# Patient Record
Sex: FEMALE | Race: WHITE | Employment: FULL TIME | URBAN - METROPOLITAN AREA
[De-identification: names, ages, dates, MRNs, and addresses within clinical notes are randomized per-mention and may not be internally consistent; named-entity substitution may affect disease eponyms.]

---

## 2022-02-08 ENCOUNTER — HOSPITAL ENCOUNTER (EMERGENCY)
Age: 47
Discharge: HOME | End: 2022-02-08
Payer: COMMERCIAL

## 2022-02-08 ENCOUNTER — APPOINTMENT (OUTPATIENT)
Dept: GENERAL RADIOLOGY | Age: 47
End: 2022-02-08
Payer: COMMERCIAL

## 2022-02-08 VITALS
WEIGHT: 240 LBS | HEIGHT: 67 IN | DIASTOLIC BLOOD PRESSURE: 85 MMHG | TEMPERATURE: 96.4 F | RESPIRATION RATE: 16 BRPM | HEART RATE: 78 BPM | BODY MASS INDEX: 37.67 KG/M2 | SYSTOLIC BLOOD PRESSURE: 137 MMHG | OXYGEN SATURATION: 98 %

## 2022-02-08 DIAGNOSIS — M25.461 EFFUSION OF RIGHT KNEE: ICD-10-CM

## 2022-02-08 DIAGNOSIS — S83.91XA SPRAIN OF RIGHT KNEE, UNSPECIFIED LIGAMENT, INITIAL ENCOUNTER: ICD-10-CM

## 2022-02-08 DIAGNOSIS — S86.911A STRAIN OF RIGHT KNEE, INITIAL ENCOUNTER: Primary | ICD-10-CM

## 2022-02-08 PROCEDURE — 6370000000 HC RX 637 (ALT 250 FOR IP): Performed by: PHYSICIAN ASSISTANT

## 2022-02-08 PROCEDURE — 99284 EMERGENCY DEPT VISIT MOD MDM: CPT

## 2022-02-08 PROCEDURE — 73564 X-RAY EXAM KNEE 4 OR MORE: CPT

## 2022-02-08 RX ORDER — HYDROCODONE BITARTRATE AND ACETAMINOPHEN 5; 325 MG/1; MG/1
2 TABLET ORAL ONCE
Status: COMPLETED | OUTPATIENT
Start: 2022-02-08 | End: 2022-02-08

## 2022-02-08 RX ORDER — IBUPROFEN 600 MG/1
600 TABLET ORAL ONCE
Status: COMPLETED | OUTPATIENT
Start: 2022-02-08 | End: 2022-02-08

## 2022-02-08 RX ORDER — HYDROCODONE BITARTRATE AND ACETAMINOPHEN 5; 325 MG/1; MG/1
1 TABLET ORAL EVERY 8 HOURS PRN
Qty: 9 TABLET | Refills: 0 | Status: SHIPPED | OUTPATIENT
Start: 2022-02-08 | End: 2022-02-11

## 2022-02-08 RX ADMIN — IBUPROFEN 600 MG: 600 TABLET ORAL at 22:29

## 2022-02-08 RX ADMIN — HYDROCODONE BITARTRATE AND ACETAMINOPHEN 2 TABLET: 5; 325 TABLET ORAL at 22:29

## 2022-02-08 ASSESSMENT — PAIN DESCRIPTION - FREQUENCY: FREQUENCY: CONTINUOUS

## 2022-02-08 ASSESSMENT — PAIN SCALES - GENERAL
PAINLEVEL_OUTOF10: 10
PAINLEVEL_OUTOF10: 10

## 2022-02-08 ASSESSMENT — PAIN DESCRIPTION - ORIENTATION: ORIENTATION: RIGHT

## 2022-02-08 ASSESSMENT — PAIN DESCRIPTION - LOCATION: LOCATION: KNEE

## 2022-02-08 ASSESSMENT — PAIN DESCRIPTION - PAIN TYPE: TYPE: ACUTE PAIN

## 2022-02-08 ASSESSMENT — PAIN DESCRIPTION - DESCRIPTORS: DESCRIPTORS: ACHING

## 2022-02-15 ENCOUNTER — OFFICE VISIT (OUTPATIENT)
Dept: ORTHOPEDIC SURGERY | Age: 47
End: 2022-02-15
Payer: COMMERCIAL

## 2022-02-15 DIAGNOSIS — S83.241A ACUTE MEDIAL MENISCUS TEAR, RIGHT, INITIAL ENCOUNTER: Primary | ICD-10-CM

## 2022-02-15 PROCEDURE — 99244 OFF/OP CNSLTJ NEW/EST MOD 40: CPT | Performed by: ORTHOPAEDIC SURGERY

## 2022-02-15 RX ORDER — IBUPROFEN 200 MG
200 TABLET ORAL EVERY 6 HOURS PRN
COMMUNITY

## 2022-02-15 RX ORDER — NAPROXEN 500 MG/1
500 TABLET ORAL 2 TIMES DAILY WITH MEALS
COMMUNITY

## 2022-02-15 RX ORDER — HYDROCODONE BITARTRATE AND ACETAMINOPHEN 5; 325 MG/1; MG/1
1 TABLET ORAL EVERY 6 HOURS PRN
COMMUNITY

## 2022-02-15 NOTE — LETTER
2/15/2022        Karlene Aurea  1975    To Whom It May Concern: The above named patient is currently under my care for the treatment of She will need to return to work Monday 2/21/22 seated duty only no stairs. Pending follow up treatment/care/testing for Right knee injury. If you have any questions regarding this patient or his/her care please  Contact my office. Thank you.       Sincerely,        Ting Marcelino MD  [address]  PH# [phone]

## 2022-02-15 NOTE — PROGRESS NOTES
taking: Reported on 2/15/2022)       No current facility-administered medications for this visit. ALLERGIES   No Known Allergies    FAMILY HISTORY   History reviewed. No pertinent family history. SOCIAL HISTORY     Social History     Socioeconomic History    Marital status:      Spouse name: None    Number of children: None    Years of education: None    Highest education level: None   Occupational History    None   Tobacco Use    Smoking status: Never Smoker    Smokeless tobacco: Never Used   Substance and Sexual Activity    Alcohol use: Never    Drug use: Never    Sexual activity: None   Other Topics Concern    None   Social History Narrative    None     Social Determinants of Health     Financial Resource Strain:     Difficulty of Paying Living Expenses: Not on file   Food Insecurity:     Worried About Running Out of Food in the Last Year: Not on file    [place] of Food in the Last Year: Not on file   Transportation Needs:     Lack of Transportation (Medical): Not on file    Lack of Transportation (Non-Medical):  Not on file   Physical Activity:     Days of Exercise per Week: Not on file    Minutes of Exercise per Session: Not on file   Stress:     Feeling of Stress : Not on file   Social Connections:     Frequency of Communication with Friends and Family: Not on file    Frequency of Social Gatherings with Friends and Family: Not on file    Attends Roman Catholic Services: Not on file    Active Member of Clubs or Organizations: Not on file    Attends Club or Organization Meetings: Not on file    Marital Status: Not on file   Intimate Partner Violence:     Fear of Current or Ex-Partner: Not on file    Emotionally Abused: Not on file    Physically Abused: Not on file    Sexually Abused: Not on file   Housing Stability:     Unable to Pay for Housing in the Last Year: Not on file    Number of [place] in the Last Year: Not on file    Unstable Housing in the Last Year: Not on file       REVIEW OF SYSTEMS   General: no fever, chills, night sweats, anorexia, malaise, fatigue, or weight change  Hematologic:  no unexplained bleeding or bruising  HEENT:   no nasal congestion, rhinorrhea, sore throat, or facial pain  Respiratory:  no cough, dyspnea, or chest pain  Cardiovascular:  no angina, AMOR, PND, orthopnea, dependent edema, or palpitations  Gastrointestinal:  no nausea, vomiting, diarrhea, constipation, or abdominal pain  Genitourinary:  no urinary urgency, frequency, dysuria, or hematuria  Musculoskeletal: see HPI  Endocrine:  no heat or cold intolerance and no polyphagia, polydipsia, or polyuria  Skin:  no skin eruptions or changing lesions  Neurologic:  no focal weakness, numbness/tingling, tremor, or severe headache. See HPI. See HPI for pertinent positives. PHYSICAL EXAM   Vital Signs: There were no vitals taken for this visit. General appearance: healthy, alert, no distress  Skin: Skin color, texture, turgor normal. No rashes or lesions  HEENT: atraumatic, normocephalic. PERRL  Respiratory: Unlabored breathing  Lymphatic: No adenopathy   Neuro: Alert and oriented, normal distal sensation, normal bilateral DTRs  Vascular: Normal distal capillary and distal pulses  Muskuloskeletal Exam:     Right Knee Examination    Inspection: Inspection of the knee reveals no swelling, ecchymosis or deformity. Palpation: There is a mild knee effusion. and There is moderate tenderness along the medial joint line. Range of Motion: Full extension to 20 degrees of flexion. Range of motion restricted secondary to pain    Strength: Hamstrings rated: 5/5. Quadriceps rated: 5/5. Special Tests: The knee is stable to varus valgus stress and Lachman's testing. She is unable to tolerate any additional provocative maneuvers.   There is pain throughout range of motion along the medial knee and medial patellofemoral retinaculum     Gait: Gait is antalgic favoring the affected side.        RADIOLOGY   X-rays obtained and reviewed in office:  Views right knee 4 views    Impression: Moderate tricompartmental degenerative changes noted. IMPRESSION     1. Acute medial meniscus tear, right, initial encounter         PLAN   I had a lengthy discussion with patient today regarding diagnosis and treatment options and recommendations. Recommend further evaluation of the knee with an MRI scan to evaluate for internal derangement. Continue with crutches, rest, ice, over-the-counter analgesic medications. Discontinue the knee immobilizer. Patient instructed on ankle pumps range of motion exercises and knee range of motion exercises. FOLLOWUP     Return for test results. No orders of the defined types were placed in this encounter. No orders of the defined types were placed in this encounter.       Patient was instructed on appropriate use of braces, participation in home exercise programs, healthy lifestyle choices and weight loss as appropriate     Ritika Layne MD

## 2022-02-21 ENCOUNTER — HOSPITAL ENCOUNTER (OUTPATIENT)
Dept: MRI IMAGING | Age: 47
Discharge: HOME | End: 2022-02-21
Payer: COMMERCIAL

## 2022-02-21 DIAGNOSIS — S83.241A ACUTE MEDIAL MENISCUS TEAR, RIGHT, INITIAL ENCOUNTER: ICD-10-CM

## 2022-02-21 PROCEDURE — 73721 MRI JNT OF LWR EXTRE W/O DYE: CPT

## 2022-02-22 ENCOUNTER — TELEPHONE (OUTPATIENT)
Dept: ORTHOPEDIC SURGERY | Age: 47
End: 2022-02-22

## 2022-02-22 NOTE — TELEPHONE ENCOUNTER
reaom to notify pt of MRI approval [facility]\    luke to set up follow up appt for results    Scan completed 2/21/2022      C9 APPROVED WITH DISCLAIMER FOR MRI LEFT KNEE 2-- 3-

## 2022-02-25 ENCOUNTER — TELEPHONE (OUTPATIENT)
Dept: ORTHOPEDIC SURGERY | Age: 47
End: 2022-02-25

## 2022-02-25 NOTE — TELEPHONE ENCOUNTER
Called patient appointment for today needs to be rescheduled due to [facility] needing to perform an emergent surgery.   APRIL WEIR

## 2022-03-01 ENCOUNTER — OFFICE VISIT (OUTPATIENT)
Dept: ORTHOPEDIC SURGERY | Age: 47
End: 2022-03-01
Payer: COMMERCIAL

## 2022-03-01 VITALS — BODY MASS INDEX: 37.67 KG/M2 | WEIGHT: 240 LBS | HEIGHT: 67 IN

## 2022-03-01 DIAGNOSIS — M25.462 KNEE EFFUSION, LEFT: ICD-10-CM

## 2022-03-01 DIAGNOSIS — M17.11 PRIMARY OSTEOARTHRITIS OF RIGHT KNEE: Primary | ICD-10-CM

## 2022-03-01 DIAGNOSIS — S86.911D KNEE STRAIN, RIGHT, SUBSEQUENT ENCOUNTER: ICD-10-CM

## 2022-03-01 DIAGNOSIS — M23.301 DEGENERATION OF LATERAL MENISCUS, LEFT: ICD-10-CM

## 2022-03-01 PROCEDURE — 99214 OFFICE O/P EST MOD 30 MIN: CPT | Performed by: ORTHOPAEDIC SURGERY

## 2022-03-01 PROCEDURE — 20610 DRAIN/INJ JOINT/BURSA W/O US: CPT | Performed by: ORTHOPAEDIC SURGERY

## 2022-03-01 RX ORDER — METHYLPREDNISOLONE ACETATE 40 MG/ML
40 INJECTION, SUSPENSION INTRA-ARTICULAR; INTRALESIONAL; INTRAMUSCULAR; SOFT TISSUE ONCE
Status: COMPLETED | OUTPATIENT
Start: 2022-03-01 | End: 2022-03-01

## 2022-03-01 RX ORDER — LIDOCAINE HYDROCHLORIDE 10 MG/ML
2 INJECTION, SOLUTION INFILTRATION; PERINEURAL ONCE
Status: COMPLETED | OUTPATIENT
Start: 2022-03-01 | End: 2022-03-01

## 2022-03-01 RX ADMIN — METHYLPREDNISOLONE ACETATE 40 MG: 40 INJECTION, SUSPENSION INTRA-ARTICULAR; INTRALESIONAL; INTRAMUSCULAR; SOFT TISSUE at 11:32

## 2022-03-01 RX ADMIN — LIDOCAINE HYDROCHLORIDE 2 ML: 10 INJECTION, SOLUTION INFILTRATION; PERINEURAL at 11:31

## 2022-03-01 NOTE — LETTER
3/1/22          Karlene Vick. [address]  1975        The above named patient was seen and evaluated in my office today. She may return to work on seated duty only, activity as tolerated until next evaluation in 6 weeks. If you have questions or concerns please contact my office. Thank you.      Sincerely,            Kristie Laws MD  [facility]  [phone]

## 2022-03-01 NOTE — PROGRESS NOTES
[facility]  Outpatient Progress Note  Beth Alfaro MD    Patient Name: Milady Nair MRN: <[id number]>   Age: 52 y.o. YOB: 1975   Sex: female      [address] Complaint   Patient presents with    Follow-up     MRI resutls / right knee        HISTORY OF PRESENT ILLNESS   Milady Nair is a 52 y.o. female   The patient presents for a follow up visit:  She states the pain in her right knee is improved but she still having some sharp pain in the medial knee and some clicking and grinding in the front of the knee. Swelling is improved. She is ambulating without assistive devices. She presents today for follow-up after the MRI scan. She has been working on sedentary duty. Pain Assessment  Location of Pain: Knee  Location Modifiers: Right  Severity of Pain: 4  Quality of Pain: Sharp  Duration of Pain: Persistent  Frequency of Pain: Constant  Aggravating Factors: Walking,Standing,Stairs,Kneeling,Squatting  Limiting Behavior: Yes  Relieving Factors: Rest,Ice  Result of Injury: Yes  Work-Related Injury: No  Are there other pain locations you wish to document?: No    PAST MEDICAL HISTORY    History reviewed. No pertinent past medical history. PAST SURGICAL HISTORY   History reviewed. No pertinent surgical history. MEDICATIONS     Current Outpatient Medications   Medication Sig Dispense Refill    HYDROcodone-acetaminophen (NORCO) 5-325 MG per tablet Take 1 tablet by mouth every 6 hours as needed for Pain. (Patient not taking: Reported on 2/15/2022)      naproxen (NAPROSYN) 500 MG tablet Take 500 mg by mouth 2 times daily (with meals)      ibuprofen (ADVIL;MOTRIN) 200 MG tablet Take 200 mg by mouth every 6 hours as needed for Pain       No current facility-administered medications for this visit. ALLERGIES   No Known Allergies    FAMILY HISTORY   History reviewed. No pertinent family history.     SOCIAL HISTORY     Social History     Socioeconomic History    Marital status:      Spouse name: None    Number of children: None    Years of education: None    Highest education level: None   Occupational History    None   Tobacco Use    Smoking status: Never Smoker    Smokeless tobacco: Never Used   Substance and Sexual Activity    Alcohol use: Never    Drug use: Never    Sexual activity: None   Other Topics Concern    None   Social History Narrative    None     Social Determinants of Health     Financial Resource Strain:     Difficulty of Paying Living Expenses: Not on file   Food Insecurity:     Worried About Running Out of Food in the Last Year: Not on file    [place] of Food in the Last Year: Not on file   Transportation Needs:     Lack of Transportation (Medical): Not on file    Lack of Transportation (Non-Medical):  Not on file   Physical Activity:     Days of Exercise per Week: Not on file    Minutes of Exercise per Session: Not on file   Stress:     Feeling of Stress : Not on file   Social Connections:     Frequency of Communication with Friends and Family: Not on file    Frequency of Social Gatherings with Friends and Family: Not on file    Attends Druze Services: Not on file    Active Member of [employer] or Organizations: Not on file    Attends Club or Organization Meetings: Not on file    Marital Status: Not on file   Intimate Partner Violence:     Fear of Current or Ex-Partner: Not on file    Emotionally Abused: Not on file    Physically Abused: Not on file    Sexually Abused: Not on file   Housing Stability:     Unable to Pay for Housing in the Last Year: Not on file    Number of [place] in the Last Year: Not on file    Unstable Housing in the Last Year: Not on file       REVIEW OF SYSTEMS   General: no fever, chills, night sweats, anorexia, malaise, fatigue, or weight change  Hematologic:  no unexplained bleeding or bruising  HEENT:   no nasal congestion, rhinorrhea, sore throat, or facial pain  Respiratory: no cough, dyspnea, or chest pain  Cardiovascular:  no angina, AMOR, PND, orthopnea, dependent edema, or palpitations  Gastrointestinal:  no nausea, vomiting, diarrhea, constipation, or abdominal pain  Genitourinary:  no urinary urgency, frequency, dysuria, or hematuria  Musculoskeletal: see HPI  Endocrine:  no heat or cold intolerance and no polyphagia, polydipsia, or polyuria  Skin:  no skin eruptions or changing lesions  Neurologic:  no focal weakness, numbness/tingling, tremor, or severe headache. See HPI. See HPI for pertinent positives. PHYSICAL EXAM   Vital Signs: Ht 5' 7\" (1.702 m)   Wt 240 lb (108.9 kg)   BMI 37.59 kg/m²     General appearance: healthy, alert, no distress  Skin: Skin color, texture, turgor normal. No rashes or lesions  HEENT: atraumatic, normocephalic. PERRL  Respiratory: Unlabored breathing  Lymphatic: No adenopathy   Neuro: Alert and oriented, normal distal sensation, normal bilateral DTRs  Vascular: Normal distal capillary and distal pulses  Muskuloskeletal Exam:   Right knee has trace effusion. Nonspecific tenderness about the medial joint line is noted. There is moderate subpatellar crepitation with range of motion.   Range of motion is not restricted    RADIOLOGY   X-rays obtained and reviewed in office:  Views MRI right knee    Impression:   FINDINGS:   Exam limited due to patient motion.       MENISCI: Evaluation of the menisci is limited due to patient motion.       Degenerative superior articular surface tearing in the body of the lateral   meniscus.       Degeneration of the medial and lateral menisci.       CRUCIATE LIGAMENTS: Anterior and posterior cruciate ligaments appear intact.       EXTENSOR MECHANISM: Low-grade partial-thickness interstitial tearing of the   superior patellar tendon on image 13, series 9.  Distal quadriceps tendon and   patellar retinacula appear intact.       LATERAL COLLATERAL LIGAMENT COMPLEX: Popliteus muscle/tendon, iliotibial   band, lateral collateral ligament, and biceps femoris appear intact.       MEDIAL COLLATERAL LIGAMENT COMPLEX: Medial collateral ligament complex   appears intact.       KNEE JOINT: Small joint effusion.       Osseous alignment is normal.       No acute fracture or dislocation.       Diffuse grade 2-3 medial and lateral compartment chondromalacia.       Diffuse grade 3-4 patellofemoral chondromalacia with underlying subcortical   cystic and subchondral reactive marrow changes.       Mild tricompartmental osteophyte spurring.       BONE MARROW: Subcortical cystic changes subjacent to the tibial spine.       SOFT TISSUES: No sizable joint effusion.  Visualized popliteal neurovascular   bundle grossly unremarkable.           Impression   1. Degenerative superior articular surface tearing in the body of the lateral   meniscus.  Evaluation of the menisci limited due to patient motion. Degeneration within the medial and lateral menisci. 2. Tricompartmental osteoarthrosis.  Moderate to severe patellofemoral   chondromalacia.  Mild-to-moderate lateral and medial compartment   chondromalacia. 3. Small joint effusion. 4. Low-grade partial-thickness interstitial tearing of the superior patellar   tendon. IMPRESSION     1. Primary osteoarthritis of right knee    2. Knee strain, right, subsequent encounter    3. Degeneration of lateral meniscus, left    4. Knee effusion, left           PLAN   Recommend intra-articular injection of corticosteroid today. Recommend supervised physical therapy for range of motion and quadricep strengthening. Recommend she continue with activity modification, ice, over-the-counter analgesic medications. Increase activity as tolerated. We will keep her on sedentary duty at work for now. We will release her to return to unrestricted activity as symptoms improve. Consider series of viscosupplementation injections should she have persistent knee pain and mechanical symptoms.     PROCEDURE     Knee Injection:  The patient consented to the procedure and a time out was performed before proceeding. The anterolateral portal of the right knee was prepped in the normal sterile fashion. A 25 gauge needle was introduced into the intra-articular space. 2 mL of 1% Lidocaine and 1 mL of 40 mg/mL Depo-Medrol was injected into the joint space. The injection flowed freely, and the patient tolerated the procedure well. Post injection expectations were reviewed with the patient. FOLLOWUP     Return in about 4 weeks (around 3/29/2022) for Reevaluation.         Patient was instructed on appropriate use of braces, participation in home exercise programs, healthy lifestyle choices and weight loss as appropriate     Ritika Layne MD

## 2022-03-18 ENCOUNTER — HOSPITAL ENCOUNTER (OUTPATIENT)
Dept: PHYSICAL THERAPY | Age: 47
Setting detail: THERAPIES SERIES
Discharge: HOME | End: 2022-03-18
Payer: COMMERCIAL

## 2022-03-18 NOTE — FLOWSHEET NOTE
[facility], [address]        Physical Therapy  Cancellation/No-show Note  Patient Name:  Sp West  :  1975   Date:  3/18/2022  Cancelled visits to date: 0  No-shows to date: 1    For today's appointment patient:  []  Cancelled  []  Rescheduled appointment  [x]  No-show     Reason given by patient:  []  Patient ill  []  Conflicting appointment  []  No transportation    []  Conflict with work  []  No reason given  [x]  Other:  had LM earlier that there was no C9 approval for PT yet, pt. Did not return call and did not show for appt. Time.     Comments:      Electronically signed by:  Bula Meigs, PT, MSPT, OMT-C [id number]

## 2022-03-29 ENCOUNTER — OFFICE VISIT (OUTPATIENT)
Dept: ORTHOPEDIC SURGERY | Age: 47
End: 2022-03-29
Payer: COMMERCIAL

## 2022-03-29 VITALS — BODY MASS INDEX: 37.67 KG/M2 | WEIGHT: 240 LBS | HEIGHT: 67 IN

## 2022-03-29 DIAGNOSIS — M23.300 DEGENERATION OF LATERAL MENISCUS, RIGHT: ICD-10-CM

## 2022-03-29 DIAGNOSIS — M17.11 PRIMARY OSTEOARTHRITIS OF RIGHT KNEE: Primary | ICD-10-CM

## 2022-03-29 PROCEDURE — 99213 OFFICE O/P EST LOW 20 MIN: CPT | Performed by: ORTHOPAEDIC SURGERY

## 2022-03-29 NOTE — LETTER
3/29/2022        NATALI ARROWHEAD  1975        The above named patient may return to work 3/29/2022 no restrictions. If you have any questions or concerns please contact my office. Thank you.     Sincerely,            Sally Gar MD  [facility]    [address] [facility] [id number]    [address]  [phone]

## 2022-03-29 NOTE — PROGRESS NOTES
[facility]  Outpatient Progress Note  Michael Johnson MD    Patient Name: Bárbara Myers MRN: [id number]   Age: 52 y.o. YOB: 1975   Sex: female      [address] Complaint   Patient presents with    Follow-up     Right knee follow-up. Cortisone injection at her last visit. Has not yet started physical therapy because of a delay in approval with [facility]       HISTORY OF PRESENT ILLNESS   Bárbara Myers is a 52 y.o. female   The patient presents for a follow up visit:  Overall her right knee is feeling better. If she takes 400 to 600 mg of ibuprofen each day her pain is minimal.  She has been working essentially without restriction. She has not yet started physical therapy but plans to make her first appointment today as she has been advised that it has recently been approved through "[employer], [place]". PAST MEDICAL HISTORY    No past medical history on file. PAST SURGICAL HISTORY   No past surgical history on file. MEDICATIONS     Current Outpatient Medications   Medication Sig Dispense Refill    HYDROcodone-acetaminophen (NORCO) 5-325 MG per tablet Take 1 tablet by mouth every 6 hours as needed for Pain. (Patient not taking: Reported on 2/15/2022)      naproxen (NAPROSYN) 500 MG tablet Take 500 mg by mouth 2 times daily (with meals)      ibuprofen (ADVIL;MOTRIN) 200 MG tablet Take 200 mg by mouth every 6 hours as needed for Pain       No current facility-administered medications for this visit. ALLERGIES   No Known Allergies    FAMILY HISTORY   No family history on file.     SOCIAL HISTORY     Social History     Socioeconomic History    Marital status:      Spouse name: None    Number of children: None    Years of education: None    Highest education level: None   Occupational History    None   Tobacco Use    Smoking status: Never Smoker    Smokeless tobacco: Never Used   Substance and Sexual Activity    Alcohol use: Never    Drug use: Never    Sexual activity: None   Other Topics Concern    None   Social History Narrative    None     Social Determinants of Health     Financial Resource Strain:     Difficulty of Paying Living Expenses: Not on file   Food Insecurity:     Worried About Running Out of Food in the Last Year: Not on file    [place] of Food in the Last Year: Not on file   Transportation Needs:     Lack of Transportation (Medical): Not on file    Lack of Transportation (Non-Medical):  Not on file   Physical Activity:     Days of Exercise per Week: Not on file    Minutes of Exercise per Session: Not on file   Stress:     Feeling of Stress : Not on file   Social Connections:     Frequency of Communication with Friends and Family: Not on file    Frequency of Social Gatherings with Friends and Family: Not on file    Attends Shinto Services: Not on file    Active Member of [address] [place] [address] or Organizations: Not on file    Attends Club or Organization Meetings: Not on file    Marital Status: Not on file   Intimate Partner Violence:     Fear of Current or Ex-Partner: Not on file    Emotionally Abused: Not on file    Physically Abused: Not on file    Sexually Abused: Not on file   Housing Stability:     Unable to Pay for Housing in the Last Year: Not on file    Number of [place] in the Last Year: Not on file    Unstable Housing in the Last Year: Not on file       REVIEW OF SYSTEMS   General: no fever, chills, night sweats, anorexia, malaise, fatigue, or weight change  Hematologic:  no unexplained bleeding or bruising  HEENT:   no nasal congestion, rhinorrhea, sore throat, or facial pain  Respiratory:  no cough, dyspnea, or chest pain  Cardiovascular:  no angina, AMOR, PND, orthopnea, dependent edema, or palpitations  Gastrointestinal:  no nausea, vomiting, diarrhea, constipation, or abdominal pain  Genitourinary:  no urinary urgency, frequency, dysuria, or hematuria  Musculoskeletal: see HPI  Endocrine:  no heat or cold

## 2022-03-31 ENCOUNTER — HOSPITAL ENCOUNTER (OUTPATIENT)
Dept: PHYSICAL THERAPY | Age: 47
Setting detail: THERAPIES SERIES
Discharge: HOME | End: 2022-03-31
Payer: COMMERCIAL

## 2022-03-31 PROCEDURE — 97110 THERAPEUTIC EXERCISES: CPT | Performed by: PHYSICAL THERAPIST

## 2022-03-31 PROCEDURE — 97161 PT EVAL LOW COMPLEX 20 MIN: CPT | Performed by: PHYSICAL THERAPIST

## 2022-03-31 NOTE — FLOWSHEET NOTE
[facility], [address]  Phone: [phone]  Fax [phone]    Physical Therapy Treatment Note/ Progress Report:           Date:  3/31/2022    Patient Name:  Angela Riley    :  1975  MRN: [id number]  Restrictions/Precautions:    Medical/Treatment Diagnosis Information:  · Diagnosis: right knee strain S86.911D, right knee OA M17.11, right knee degenerative lateral meniscus  M23.301, right knee effusion M25.461  · Treatment Diagnosis: right knee OA M17.11, right knee strain X13.160P  Insurance/Certification information:  PT Insurance Information:  APPROVED FOR PHYSICAL THERAPY 2-3 X'S WEEK FOR 4-6 WEEKS 12 VISITS 3-- 2022  Physician Information:  Referring Practitioner: Dr. Ben Issa  Has the plan of care been signed (Y/N):        []  Yes  [x]  No     Date of Patient follow up with Physician:       Is this a Progress Report:     []  Yes  [x]  No        If Yes:  Date Range for reporting period:  Beginning: 3/31/22  Endin22    Progress report will be due (10 Rx or 30 days whichever is less):        Recertification will be due (POC Duration  / 90 days whichever is less):       Visit # Insurance Allowable Auth Required   In-person 1  until 22 []  Yes []  No    Telehealth   []  Yes []  No    Total          Therapy Diagnosis/Practice Pattern:E      Number of Comorbidities:  []0     [x]1-2    []3+    Latex Allergy:  [x]NO      []YES  Language for Healthcare:   [x]English       []other:      SUBJECTIVE:  See eval    OBJECTIVE: See eval   Observation:    Test measurements:      RESTRICTIONS/PRECAUTIONS: OA    Exercises/Interventions:     Therapeutic Ex (32118) Sets/sec Reps Notes/CUES   bike  [employer] sets :10 X 10 hep   SLR  Flex, abd  2 x 10 hep    supine and seated heel slides  X 10 he0p   Seated HS s/  Seated gastroc s :20 3x hep         Standing HR/TL  nv    Mini squats nv    Leg press  ? Manual Intervention (72103)                                          NMR re-education (38702)   CUES NEEDED                                                         Therapeutic Activity (17413)                                                Therapeutic Exercise and NMR EXR  [x] (13729) Provided verbal/tactile cueing for activities related to strengthening, flexibility, endurance, ROM for improvements in LE, proximal hip, and core control with self care, mobility, lifting, ambulation.  [] (43300) Provided verbal/tactile cueing for activities related to improving balance, coordination, kinesthetic sense, posture, motor skill, proprioception  to assist with LE, proximal hip, and core control in self care, mobility, lifting, ambulation and eccentric single leg control.      NMR and Therapeutic Activities:    [] (17905 or 43097) Provided verbal/tactile cueing for activities related to improving balance, coordination, kinesthetic sense, posture, motor skill, proprioception and motor activation to allow for proper function of core, proximal hip and LE with self care and ADLs  [] (75126) Gait Re-education- Provided training and instruction to the patient for proper LE, core and proximal hip recruitment and positioning and eccentric body weight control with ambulation re-education including up and down stairs     Home Exercise Program:    [x] (76059) Reviewed/Progressed HEP activities related to strengthening, flexibility, endurance, ROM of core, proximal hip and LE for functional self-care, mobility, lifting and ambulation/stair navigation   [] (34364)Reviewed/Progressed HEP activities related to improving balance, coordination, kinesthetic sense, posture, motor skill, proprioception of core, proximal hip and LE for self care, mobility, lifting, and ambulation/stair navigation      Manual Treatments:  PROM / STM / Oscillations-Mobs:  G-I, II, III, IV (PA's, Inf., Post.)  [] (34732) Provided control, within 5lb HHD in LE to allow for proper functional mobility as indicated by patients Functional Deficits. [] Progressing: [] Met: [] Not Met: [] Adjusted  4. Patient will return to ascending and descending stairs with symmetrical, reciprocal gait without increased symptoms or restriction. [] Progressing: [] Met: [] Not Met: [] Adjusted  5. Able to run at work to diffuse conflict. [] Progressing: [] Met: [] Not Met: [] Adjusted  6. Able to work 13 hour shift without knee buckling/ giving way, swelling. Progression Towards Functional goals:  [] Patient is progressing as expected towards functional goals listed. [] Progression is slowed due to complexities listed. [] Progression has been slowed due to co-morbidities. [x] Plan just implemented, too soon to assess goals progression  [] Other:         Overall Progression Towards Functional goals/ Treatment Progress Update:  [] Patient is progressing as expected towards functional goals listed. [] Progression is slowed due to complexities/Impairments listed. [] Progression has been slowed due to co-morbidities.   [x] Plan just implemented, too soon to assess goals progression <30days   [] Goals require adjustment due to lack of progress  [] Patient is not progressing as expected and requires additional follow up with physician  [] Other    Prognosis for POC: [x] Good [] Fair  [] Poor      Patient requires continued skilled intervention: [x] Yes  [] No    Treatment/Activity Tolerance:  [x] Patient able to complete treatment  [] Patient limited by fatigue  [] Patient limited by pain    [] Patient limited by other medical complications  [] Other:     ASSESSMENT: see eval       PLAN: See eval  [] Continue per plan of care [] Alter current plan (see comments above)  [x] Plan of care initiated [] Hold pending MD visit [] Discharge      Electronically signed by:  Madeline Rodriguez PT    Note: If patient does not return for scheduled/ recommended follow up visits, this note will serve as a discharge from care along with most recent update on progress.

## 2022-03-31 NOTE — PLAN OF CARE
[facility], [address] [place] [address]  Phone: [phone]  Fax [phone]     Physical Therapy Certification    Dear Referring Practitioner: Dr. Che Martinez,    We had the pleasure of evaluating the following patient for physical therapy services at [address] [place] [address]. A summary of our findings can be found in the initial assessment below. This includes our plan of care. If you have any questions or concerns regarding these findings, please do not hesitate to contact me at the office phone number checked above. Thank you for the referral.       Physician Signature:_______________________________Date:__________________  By signing above (or electronic signature), therapists plan is approved by physician    Patient: Shelia Wagner   : 1975   MRN: [id number]  Referring Physician: Referring Practitioner: Dr. Che Martinez      Evaluation Date: 3/31/2022      Medical Diagnosis Information:  Diagnosis: right knee strain S86.911D, right knee OA M17.11, right knee degenerative lateral meniscus  M23.301, right knee effusion M25.461   Treatment Diagnosis: right knee OA M17.11, right knee strain S86.911D                                         Insurance information: PT Insurance Information:  APPROVED FOR PHYSICAL THERAPY 2-3 X'S WEEK FOR 4-6 WEEKS 12 VISITS 3-- 2022       Precautions/ Contra-indications: HBP, depression.      C-SSRS Triggered by Intake questionnaire (Past 2 wk assessment):   [x] No, Questionnaire did not trigger screening.   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 She felt a pop in knee when she pivoted. She had difficulty walking slowly back to her office. She had to call her  to take her to ER. The first few days, she had a lot of difficulty moving, and she had to use crutches. Pt saw Dr. Crystal Mclaughlin, and she was approved for shot. She got the injection about 3 weeks ago, and she did get relief. Now they are seeking approval for viscosupplement injections. Pt is back to work, but her coworkers know she had trouble with stairs (sofiya downstairs) and she is unable to run. Pt is unable to stand or walking longer than 30 min without pain. Pt occasionally get buckling. She also has pain with driving. Pt is sleeping well. Pt is using Voltarin cream.      Relevant Medical History:left knee injury- worker's comp injury a couple of years ago. Functional Disability Index: FOTO 44    Pain Scale: 2/10  Easing factors: rest, meds, injection,   Provocative factors: standing, kneeling, squatting, stairs, driving     Type: []Constant   []Intermittent  []Radiating []Localized []other:     Numbness/Tingling: n/a    Occupation/School:[employer]     Living Status/Prior Level of Function: Independent with ADLs and IADLs,     OBJECTIVE:     ROM LEFT RIGHT   Knee ext 0 0   Knee Flex 126 125   Ankle DF     Strength  LEFT RIGHT   HIP Flexors 5 5   HIP Abductors     Knee EXT (quad) 5 4+   Knee Flex (HS) 4+ 4   Ankle DF 5 5   Circumference      50.0 cm        Pain scale  2/10   Foto  44     Reflexes/Sensation:    [x]Dermatomes/Myotomes intact    []Reflexes equal and normal bilaterally   []Other:    Joint mobility:    [x]Normal    []Hypo   []Hyper    Palpation: peripatellar discomfort with light palpation    Functional Mobility/Transfers: indep    Posture: valgus alignment bilaterally.      Bandages/Dressings/Incisions: n/a    Gait: (include devices/WB status) Pt has symmetrical stride length    Orthopedic Special Tests: - marjan's   - pat apprehension. Did not test ivy's due to MRI results. Did not want to irritate and decrease benefit of cortisone injection. [x] Patient history, allergies, meds reviewed. Medical chart reviewed. See intake form. Review Of Systems (ROS):  [x]Performed Review of systems (Integumentary, CardioPulmonary, Neurological) by intake and observation. Intake form has been scanned into medical record. Patient has been instructed to contact their primary care physician regarding ROS issues if not already being addressed at this time. Co-morbidities/Complexities (which will affect course of rehabilitation):   []None           Arthritic conditions   []Rheumatoid arthritis (M05.9)  [x]Osteoarthritis (M19.91)   Cardiovascular conditions   [x]Hypertension (I10)  []Hyperlipidemia (E78.5)  []Angina pectoris (I20)  []Atherosclerosis (I70)   Musculoskeletal conditions   []Disc pathology   []Congenital spine pathologies   []Prior surgical intervention  []Osteoporosis (M81.8)  []Osteopenia (M85.8)   Endocrine conditions   []Hypothyroid (E03.9)  []Hyperthyroid Gastrointestinal conditions   []Constipation (Z09.02)   Metabolic conditions   []Morbid obesity (E66.01)  []Diabetes type 1(E10.65) or 2 (E11.65)   []Neuropathy (G60.9)     Pulmonary conditions   []Asthma (J45)  []Coughing   []COPD (J44.9)   Psychological Disorders  []Anxiety (F41.9)  []Depression (F32.9)   []Other:   []Other:          Barriers to/and or personal factors that will affect rehab potential:              []Age  []Sex              []Motivation/Lack of Motivation                        []Co-Morbidities              []Cognitive Function, education/learning barriers              []Environmental, home barriers              []profession/work barriers  []past PT/medical experience  []other:  Justification: should progress well    Falls Risk Assessment (30 days):   [x] Falls Risk assessed and no intervention required.   [] Falls Risk assessed and Patient requires intervention due to being higher risk   TUG score (>12s at risk):     [] Falls education provided, including         ASSESSMENT:   Functional Impairments:     []Noted lumbar/proximal hip/LE joint hypomobility   []Decreased LE functional ROM   [x]Decreased core/proximal hip strength and neuromuscular control   [x]Decreased LE functional strength   [x]Reduced balance/proprioceptive control   []other:      Functional Activity Limitations (from functional questionnaire and intake)   [x]Reduced ability to tolerate prolonged functional positions   [x]Reduced ability or difficulty with changes of positions or transfers between positions   [x]Reduced ability to maintain good posture and demonstrate good body mechanics with sitting, bending, and lifting   []Reduced ability to sleep   [x] Reduced ability or tolerance with driving and/or computer work   [x]Reduced ability to perform lifting, carrying tasks   [x]Reduced ability to squat   []Reduced ability to forward bend   [x]Reduced ability to ambulate prolonged functional periods/distances/surfaces   [x]Reduced ability to ascend/descend stairs   [x]Reduced ability to run, hop, cut or jump   []other:    Participation Restrictions   []Reduced participation in self care activities   [x]Reduced participation in home management activities   [x]Reduced participation in work activities   [x]Reduced participation in social activities. []Reduced participation in sport/recreation activities. Classification :    []Signs/symptoms consistent with post-surgical status including decreased ROM, strength and function.    []Signs/symptoms consistent with joint sprain/strain   []Signs/symptoms consistent with patella-femoral syndrome   [x]Signs/symptoms consistent with knee OA/hip OA   []Signs/symptoms consistent with internal derangement of knee/Hip   []Signs/symptoms consistent with functional hip weakness/NMR control      []Signs/symptoms consistent with tendinitis/tendinosis    []signs/symptoms consistent with pathology which may benefit from Dry needling      []other:      Prognosis/Rehab Potential:      []Excellent   [x]Good    []Fair   []Poor    Tolerance of evaluation/treatment:    []Excellent   [x]Good    []Fair   []Poor  Physical Therapy Evaluation Complexity Justification  [x] A history of present problem with:  [] no personal factors and/or comorbidities that impact the plan of care;  [x]1-2 personal factors and/or comorbidities that impact the plan of care  []3 personal factors and/or comorbidities that impact the plan of care  [x] An examination of body systems using standardized tests and measures addressing any of the following: body structures and functions (impairments), activity limitations, and/or participation restrictions;:  [] a total of 1-2 or more elements   [x] a total of 3 or more elements   [] a total of 4 or more elements   [x] A clinical presentation with:  [x] stable and/or uncomplicated characteristics   [] evolving clinical presentation with changing characteristics  [] unstable and unpredictable characteristics;   [x] Clinical decision making of [x] low, [] moderate, [] high complexity using standardized patient assessment instrument and/or measurable assessment of functional outcome. [x] EVAL (LOW) 33407 (typically 20 minutes face-to-face)  [] EVAL (MOD) 38811 (typically 30 minutes face-to-face)  [] EVAL (HIGH) 01780 (typically 45 minutes face-to-face)  [] RE-EVAL       PLAN:   Frequency/Duration:  2 days per week for 6 Weeks:  Interventions:  [x]  Therapeutic exercise including: strength training, ROM, for Lower extremity and core   [x]  NMR activation and proprioception for LE, Glutes and Core   [x]  Manual therapy as indicated for LE, Hip and spine to include: Dry Needling/IASTM, STM, PROM, Gr I-IV mobilizations, manipulation.    [x] Modalities as needed that may include: thermal agents, E-stim, Biofeedback, US, iontophoresis as indicated  [x] Patient education on joint protection, postural re-education, activity modification, progression of HEP. HEP instruction: [employer]    GOALS:  Patient stated goal: strengthen, jog. Jump, doing downstairs. [] Progressing: [] Met: [] Not Met: [] Adjusted    Therapist goals for Patient:   Short Term Goals: To be achieved in: 2 weeks  1. Independent in HEP and progression per patient tolerance, in order to prevent re-injury. [] Progressing: [] Met: [] Not Met: [] Adjusted  2. Patient will have a decrease in pain to facilitate improvement in movement, function, and ADLs as indicated by Functional Deficits. [] Progressing: [] Met: [] Not Met: [] Adjusted    Long Term Goals: To be achieved in: 6 weeks  1. Disability index score of 57 or more for FOTO to assist with reaching prior level of function. [] Progressing: [] Met: [] Not Met: [] Adjusted  2. Patient will demonstrate increased AROM to 130 to allow for proper joint functioning as indicated by patients Functional Deficits. [] Progressing: [] Met: [] Not Met: [] Adjusted  3. Patient will demonstrate an increase in Strength to good proximal hip strength and control, within 5lb HHD in LE to allow for proper functional mobility as indicated by patients Functional Deficits. [] Progressing: [] Met: [] Not Met: [] Adjusted  4. Patient will return to ascending and descending stairs with symmetrical, reciprocal gait without increased symptoms or restriction. [] Progressing: [] Met: [] Not Met: [] Adjusted  5. Able to run at work to diffuse conflict. [] Progressing: [] Met: [] Not Met: [] Adjusted  6. Able to work 13 hour shift without knee buckling/ giving way, swelling.       Electronically signed by:  Gudelia Buchanan, PT

## 2022-04-04 ENCOUNTER — HOSPITAL ENCOUNTER (OUTPATIENT)
Dept: PHYSICAL THERAPY | Age: 47
Setting detail: THERAPIES SERIES
Discharge: HOME | End: 2022-04-04
Payer: COMMERCIAL

## 2022-04-04 PROCEDURE — 97110 THERAPEUTIC EXERCISES: CPT | Performed by: PHYSICAL THERAPIST

## 2022-04-04 PROCEDURE — 97140 MANUAL THERAPY 1/> REGIONS: CPT | Performed by: PHYSICAL THERAPIST

## 2022-04-04 NOTE — FLOWSHEET NOTE
[facility], [address]  Phone: [phone]  Fax [phone]    Physical Therapy Treatment Note/ Progress Report:           Date:  2022    Patient Name:  Tiago Adams    :  1975  MRN: [id number]  Restrictions/Precautions:    Medical/Treatment Diagnosis Information:  · Diagnosis: right knee strain S86.911D, right knee OA M17.11, right knee degenerative lateral meniscus  M23.301, right knee effusion M25.461  · Treatment Diagnosis: right knee OA M17.11, right knee strain F22.883R  Insurance/Certification information:  PT Insurance Information: [id number] APPROVED FOR PHYSICAL THERAPY 2-3 X'S WEEK FOR 4-6 WEEKS 12 VISITS 3-- 2022  Physician Information:  Referring Practitioner: Dr. Camelia Fisher  Has the plan of care been signed (Y/N):        []  Yes  [x]  No     Date of Patient follow up with Physician:       Is this a Progress Report:     []  Yes  [x]  No        If Yes:  Date Range for reporting period:  Beginning: 3/31/22  Endin22    Progress report will be due (10 Rx or 30 days whichever is less):        Recertification will be due (POC Duration  / 90 days whichever is less):       Visit # Insurance Allowable Auth Required   In-person 2  until 22 []  Yes []  No    Telehealth   []  Yes []  No    Total          Therapy Diagnosis/Practice Pattern:E      Number of Comorbidities:  []0     [x]1-2    []3+    Latex Allergy:  [x]NO      []YES  Language for Healthcare:   [x]English       []other:      SUBJECTIVE:  No issues with HEP. Whole body aches today, believes secondary to the colder weather. No specific area of knee pain. Going to work after session today.      OBJECTIVE: See eval   Observation:    Test measurements:      RESTRICTIONS/PRECAUTIONS: OA    Exercises/Interventions:     Therapeutic Ex (77002 Sets/sec Reps Notes/CUES   bike  X 5 min           Quad sets hep   SLR  Flex, abd 2#  2 x 10 hep    supine and seated heel slides he0p   Seated HS s/  Seated gastroc s hep   clamshells OVL  20 x     Bridges 5\"  15  x    Standing HR/TL  20 x     Mini squats  20 x     Leg press 100# 20 x    Incline stretch  20\"  3 x           Manual Intervention (41134)      Patellar mob , manual GS/ hS stretch, PROM of knee, prone quad stretch   X 8 minutes                                  NMR re-education (11568)   CUES NEEDED   LBW OVL 2 laps                                                    Therapeutic Activity (72974)                                                Therapeutic Exercise and NMR EXR  [x] (14995) Provided verbal/tactile cueing for activities related to strengthening, flexibility, endurance, ROM for improvements in LE, proximal hip, and core control with self care, mobility, lifting, ambulation.  [] (58217) Provided verbal/tactile cueing for activities related to improving balance, coordination, kinesthetic sense, posture, motor skill, proprioception  to assist with LE, proximal hip, and core control in self care, mobility, lifting, ambulation and eccentric single leg control.      NMR and Therapeutic Activities:    [] (59824 or 10676) Provided verbal/tactile cueing for activities related to improving balance, coordination, kinesthetic sense, posture, motor skill, proprioception and motor activation to allow for proper function of core, proximal hip and LE with self care and ADLs  [] (29169) Gait Re-education- Provided training and instruction to the patient for proper LE, core and proximal hip recruitment and positioning and eccentric body weight control with ambulation re-education including up and down stairs     Home Exercise Program:    [x] (89481) Reviewed/Progressed HEP activities related to strengthening, flexibility, endurance, ROM of core, proximal hip and LE for functional self-care, mobility, lifting and ambulation/stair navigation   [] (50995)Reviewed/Progressed HEP activities related to improving balance, coordination, kinesthetic sense, posture, motor skill, proprioception of core, proximal hip and LE for self care, mobility, lifting, and ambulation/stair navigation      Manual Treatments:  PROM / STM / Oscillations-Mobs:  G-I, II, III, IV (PA's, Inf., Post.)  [x] (35131) Provided manual therapy to mobilize LE, proximal hip and/or LS spine soft tissue/joints for the purpose of modulating pain, promoting relaxation,  increasing ROM, reducing/eliminating soft tissue swelling/inflammation/restriction, improving soft tissue extensibility and allowing for proper ROM for normal function with self care, mobility, lifting and ambulation. Modalities:     [] GAME READY (VASO)- for significant edema, swelling, pain control. Charges  Timed Code Treatment Minutes: 35   Total Treatment Minutes: 35       BWC time in/ time out: 245-320   TE time in /out 255-320   Manual time in/out 245-255   Neuro re ed time in/out    Untimed minutes        [] EVAL (LOW) 33990   [] EVAL (MOD) 07742  [] EVAL (HIGH) 88199   [] RE-EVAL     [x] QK(59828) x 2   [] IONTO  [] NMR (33380) x     [] VASO  [x] Manual (38885) x 1     [] Other:  [] TA x      [] Mech Traction (73950)  [] ES(attended) (55619)      [] ES (un) (88869):       GOALS:   Patient stated goal: strengthen, jog. Jump, doing downstairs. [] Progressing: [] Met: [] Not Met: [] Adjusted    Therapist goals for Patient:   Short Term Goals: To be achieved in: 2 weeks  1. Independent in HEP and progression per patient tolerance, in order to prevent re-injury. [] Progressing: [] Met: [] Not Met: [] Adjusted  2. Patient will have a decrease in pain to facilitate improvement in movement, function, and ADLs as indicated by Functional Deficits. [] Progressing: [] Met: [] Not Met: [] Adjusted    Long Term Goals: To be achieved in: 6 weeks  1. Disability index score of 57 or more for FOTO to assist with reaching prior level of function.    [] Progressing: [] Met: [] Not Met: [] Adjusted  2. Patient will demonstrate increased AROM to 130 to allow for proper joint functioning as indicated by patients Functional Deficits. [] Progressing: [] Met: [] Not Met: [] Adjusted  3. Patient will demonstrate an increase in Strength to good proximal hip strength and control, within 5lb HHD in LE to allow for proper functional mobility as indicated by patients Functional Deficits. [] Progressing: [] Met: [] Not Met: [] Adjusted  4. Patient will return to ascending and descending stairs with symmetrical, reciprocal gait without increased symptoms or restriction. [] Progressing: [] Met: [] Not Met: [] Adjusted  5. Able to run at work to diffuse conflict. [] Progressing: [] Met: [] Not Met: [] Adjusted  6. Able to work 13 hour shift without knee buckling/ giving way, swelling. Progression Towards Functional goals:  [] Patient is progressing as expected towards functional goals listed. [] Progression is slowed due to complexities listed. [] Progression has been slowed due to co-morbidities. [x] Plan just implemented, too soon to assess goals progression  [] Other:       Overall Progression Towards Functional goals/ Treatment Progress Update:  [] Patient is progressing as expected towards functional goals listed. [] Progression is slowed due to complexities/Impairments listed. [] Progression has been slowed due to co-morbidities.   [x] Plan just implemented, too soon to assess goals progression <30days   [] Goals require adjustment due to lack of progress  [] Patient is not progressing as expected and requires additional follow up with physician  [] Other    Prognosis for POC: [x] Good [] Fair  [] Poor      Patient requires continued skilled intervention: [x] Yes  [] No    Treatment/Activity Tolerance:  [x] Patient able to complete treatment  [] Patient limited by fatigue  [] Patient limited by pain    [] Patient limited by other medical complications  [] Other:     ASSESSMENT: Patient responding well to exercises and treatment plan. No c/o pain during session. Continue to challenge strengthening and incorporate balance nv. PLAN: See eval  [x] Continue per plan of care [] Alter current plan (see comments above)  [] Plan of care initiated [] Hold pending MD visit [] Discharge      Electronically signed by:  Nabeel Brooks PT    Note: If patient does not return for scheduled/ recommended follow up visits, this note will serve as a discharge from care along with most recent update on progress.

## 2022-04-07 ENCOUNTER — HOSPITAL ENCOUNTER (OUTPATIENT)
Dept: PHYSICAL THERAPY | Age: 47
Setting detail: THERAPIES SERIES
Discharge: HOME | End: 2022-04-07
Payer: COMMERCIAL

## 2022-04-07 PROCEDURE — 97140 MANUAL THERAPY 1/> REGIONS: CPT | Performed by: PHYSICAL THERAPIST

## 2022-04-07 PROCEDURE — 97110 THERAPEUTIC EXERCISES: CPT | Performed by: PHYSICAL THERAPIST

## 2022-04-07 NOTE — FLOWSHEET NOTE
[facility], [address]  Phone: [phone]  Fax [phone]    Physical Therapy Treatment Note/ Progress Report:           Date:  2022    Patient Name:  Chantal Delatorre    :  1975  MRN: [id number]  Restrictions/Precautions:    Medical/Treatment Diagnosis Information:  · Diagnosis: right knee strain S86.911D, right knee OA M17.11, right knee degenerative lateral meniscus  M23.301, right knee effusion M25.461  · Treatment Diagnosis: right knee OA M17.11, right knee strain J50.512S  Insurance/Certification information:  PT Insurance Information:  APPROVED FOR PHYSICAL THERAPY 2-3 X'S WEEK FOR 4-6 WEEKS 12 VISITS 3-- 2022  Physician Information:  Referring Practitioner: Dr. Leopoldo Sins  Has the plan of care been signed (Y/N):        []  Yes  [x]  No     Date of Patient follow up with Physician:       Is this a Progress Report:     []  Yes  [x]  No        If Yes:  Date Range for reporting period:  Beginning: 3/31/22  Endin22    Progress report will be due (10 Rx or 30 days whichever is less):        Recertification will be due (POC Duration  / 90 days whichever is less):       Visit # Insurance Allowable Auth Required   In-person 3 12 until 22 []  Yes []  No    Telehealth   []  Yes []  No    Total          Therapy Diagnosis/Practice Pattern:E      Number of Comorbidities:  []0     [x]1-2    []3+    Latex Allergy:  [x]NO      []YES  Language for Healthcare:   [x]English       []other:      SUBJECTIVE:  No issues with HEP. Whole body aches today, believes secondary to the colder weather. No specific area of knee pain. Going to work after session today.      OBJECTIVE: See eval   Observation:    Test measurements:      RESTRICTIONS/PRECAUTIONS: OA    Exercises/Interventions:     Therapeutic Ex (08389 Sets/sec Reps Notes/CUES   bike  X 5 min           hep    supine and seated heel slides he0p   Seated HS s/  Seated gastroc s hep   clatracyls lime  20 x     Bridges 5\"  15  x    Standing HR off step  20 x     DYLON  Hip abd 45# 2 x 10    DYLON TKE 45# 2 x 10     Mini squats  20 x     Leg press   Bilat/   ecc 120# x 30 100# x 20     Incline stretch  20\"  3 x     Standing quad s :20 3x     Manual Intervention (13257)      Patellar mob , manual GS/ hS stretch, PROM of knee, prone quad stretch   X 8 min                                  NMR re-education (62165)   CUES NEEDED   LBW OVL 2 laps                                                    Therapeutic Activity (63246)                                                Therapeutic Exercise and NMR EXR  [x] (62074) Provided verbal/tactile cueing for activities related to strengthening, flexibility, endurance, ROM for improvements in LE, proximal hip, and core control with self care, mobility, lifting, ambulation.  [] (38129) Provided verbal/tactile cueing for activities related to improving balance, coordination, kinesthetic sense, posture, motor skill, proprioception  to assist with LE, proximal hip, and core control in self care, mobility, lifting, ambulation and eccentric single leg control.      NMR and Therapeutic Activities:    [] (43575 or 41823) Provided verbal/tactile cueing for activities related to improving balance, coordination, kinesthetic sense, posture, motor skill, proprioception and motor activation to allow for proper function of core, proximal hip and LE with self care and ADLs  [] (40578) Gait Re-education- Provided training and instruction to the patient for proper LE, core and proximal hip recruitment and positioning and eccentric body weight control with ambulation re-education including up and down stairs     Home Exercise Program:    [x] (68009) Reviewed/Progressed HEP activities related to strengthening, flexibility, endurance, ROM of core, proximal hip and LE for functional self-care, mobility, lifting and ambulation/stair navigation   [] (64980)Reviewed/Progressed HEP activities related to improving balance, coordination, kinesthetic sense, posture, motor skill, proprioception of core, proximal hip and LE for self care, mobility, lifting, and ambulation/stair navigation      Manual Treatments:  PROM / STM / Oscillations-Mobs:  G-I, II, III, IV (PA's, Inf., Post.)  [x] (08466) Provided manual therapy to mobilize LE, proximal hip and/or LS spine soft tissue/joints for the purpose of modulating pain, promoting relaxation,  increasing ROM, reducing/eliminating soft tissue swelling/inflammation/restriction, improving soft tissue extensibility and allowing for proper ROM for normal function with self care, mobility, lifting and ambulation. Modalities:     [] GAME READY (VASO)- for significant edema, swelling, pain control. Charges  Timed Code Treatment Minutes: 46   Total Treatment Minutes: 64       BWC time in/ time out: 12:03 - 12:59   TE time in /out 12:12 - 12:48   Manual time in/out 12:03 - 12:11   Neuro re ed time in/out    Untimed minutes 12:49 - 12:59       [] EVAL (LOW) 82101   [] EVAL (MOD) 27574  [] EVAL (HIGH) 31534   [] RE-EVAL     [x] SA(72402) x 2   [] IONTO  [] NMR (55388) x     [] VASO  [x] Manual (07248) x 1     [] Other:  [] TA x      [] Mech Traction (01503)  [] ES(attended) (31638)      [] ES (un) (50234):       GOALS:   Patient stated goal: strengthen, jog. Jump, doing downstairs. [] Progressing: [] Met: [] Not Met: [] Adjusted    Therapist goals for Patient:   Short Term Goals: To be achieved in: 2 weeks  1. Independent in HEP and progression per patient tolerance, in order to prevent re-injury. [] Progressing: [] Met: [] Not Met: [] Adjusted  2. Patient will have a decrease in pain to facilitate improvement in movement, function, and ADLs as indicated by Functional Deficits. [] Progressing: [] Met: [] Not Met: [] Adjusted    Long Term Goals: To be achieved in: 6 weeks  1.  Disability index score of 57 or more for FOTO to assist with reaching prior level of function. [] Progressing: [] Met: [] Not Met: [] Adjusted  2. Patient will demonstrate increased AROM to 130 to allow for proper joint functioning as indicated by patients Functional Deficits. [] Progressing: [] Met: [] Not Met: [] Adjusted  3. Patient will demonstrate an increase in Strength to good proximal hip strength and control, within 5lb HHD in LE to allow for proper functional mobility as indicated by patients Functional Deficits. [] Progressing: [] Met: [] Not Met: [] Adjusted  4. Patient will return to ascending and descending stairs with symmetrical, reciprocal gait without increased symptoms or restriction. [] Progressing: [] Met: [] Not Met: [] Adjusted  5. Able to run at work to diffuse conflict. [] Progressing: [] Met: [] Not Met: [] Adjusted  6. Able to work 13 hour shift without knee buckling/ giving way, swelling. Progression Towards Functional goals:  [] Patient is progressing as expected towards functional goals listed. [] Progression is slowed due to complexities listed. [] Progression has been slowed due to co-morbidities. [x] Plan just implemented, too soon to assess goals progression  [] Other:       Overall Progression Towards Functional goals/ Treatment Progress Update:  [] Patient is progressing as expected towards functional goals listed. [] Progression is slowed due to complexities/Impairments listed. [] Progression has been slowed due to co-morbidities.   [x] Plan just implemented, too soon to assess goals progression <30days   [] Goals require adjustment due to lack of progress  [] Patient is not progressing as expected and requires additional follow up with physician  [] Other    Prognosis for POC: [x] Good [] Fair  [] Poor      Patient requires continued skilled intervention: [x] Yes  [] No    Treatment/Activity Tolerance:  [x] Patient able to complete treatment  [] Patient limited by fatigue  [] Patient limited by pain    [] Patient limited by other medical complications  [] Other:     ASSESSMENT:  Incorporate balance next  visit       PLAN: See eval  [x] Continue per plan of care [] Alter current plan (see comments above)  [] Plan of care initiated [] Hold pending MD visit [] Discharge      Electronically signed by:  Cordell Vasquez PT    Note: If patient does not return for scheduled/ recommended follow up visits, this note will serve as a discharge from care along with most recent update on progress.

## 2022-04-11 ENCOUNTER — HOSPITAL ENCOUNTER (OUTPATIENT)
Dept: PHYSICAL THERAPY | Age: 47
Setting detail: THERAPIES SERIES
Discharge: HOME | End: 2022-04-11
Payer: COMMERCIAL

## 2022-04-11 PROCEDURE — 97140 MANUAL THERAPY 1/> REGIONS: CPT | Performed by: PHYSICAL THERAPIST

## 2022-04-11 PROCEDURE — 97110 THERAPEUTIC EXERCISES: CPT | Performed by: PHYSICAL THERAPIST

## 2022-04-11 NOTE — FLOWSHEET NOTE
[facility], [address]  Phone: [phone]  Fax [phone]    Physical Therapy Treatment Note/ Progress Report:           Date:  2022    Patient Name:  Lucero Marie    :  1975  MRN: [id number]  Restrictions/Precautions:    Medical/Treatment Diagnosis Information:  · Diagnosis: right knee strain S86.911D, right knee OA M17.11, right knee degenerative lateral meniscus  M23.301, right knee effusion M25.461  · Treatment Diagnosis: right knee OA M17.11, right knee strain F77.523S  Insurance/Certification information:  PT Insurance Information: C9 APPROVED FOR PHYSICAL THERAPY 2-3 X'S WEEK FOR 4-6 WEEKS 12 VISITS 3-- 2022  Physician Information:  Referring Practitioner: Dr. Alie Donahue  Has the plan of care been signed (Y/N):        []  Yes  [x]  No     Date of Patient follow up with Physician:       Is this a Progress Report:     []  Yes  [x]  No        If Yes:  Date Range for reporting period:  Beginning: 3/31/22  Endin22    Progress report will be due (10 Rx or 30 days whichever is less):        Recertification will be due (POC Duration  / 90 days whichever is less):       Visit # Insurance Allowable Auth Required   In-person  until 22 []  Yes []  No    Telehealth   []  Yes []  No    Total          Therapy Diagnosis/Practice Pattern:E      Number of Comorbidities:  []0     [x]1-2    []3+    Latex Allergy:  [x]NO      []YES  Language for Healthcare:   [x]English       []other:      SUBJECTIVE:  Pt is sleeping well. She has less pain with driving. Pt even used stairs with min discomfort this weekend.      OBJECTIVE: See eval   Observation:    Test measurements:    ROM RIGHT current   Knee ext 0    Knee Flex 125    Ankle DF      Strength  RIGHT    HIP Flexors 5    HIP Abductors      Knee EXT (quad) 4+    Knee Flex (HS) 4    Ankle DF 5    Circumference    50.0 cm        Pain scale 2/10 1.5 / 10   Foto 44         RESTRICTIONS/PRECAUTIONS: OA    Exercises/Interventions:     Therapeutic Ex (67325 Sets/sec Reps Notes/CUES   bike  X 6 min           hep    supine and seated heel slides he0p   Seated HS s/  Seated gastroc s hep   clamshells lime  20 x     Bridges 5\"  20  x    Standing HR off step  20 x     DYLON  Hip abd 45# 2 x 10       LSD 4 inch 2 x 10           Leg press   Bilat/   ecc 130# x30 110# x 20     Incline stretch  20\"  3 x     Standing quad s :20 3x     Manual Intervention (81342)      Patellar mob , manual GS/ hS stretch, PROM of knee, prone quad stretch   X 8 min                                  NMR re-education (42351)   CUES NEEDED   LBW OVL 2 laps    Tandem stance on foam  2 x  1 min    SLS :20 3x                                         Therapeutic Activity (51564)                                                Therapeutic Exercise and NMR EXR  [x] (40528) Provided verbal/tactile cueing for activities related to strengthening, flexibility, endurance, ROM for improvements in LE, proximal hip, and core control with self care, mobility, lifting, ambulation.  [] (38825) Provided verbal/tactile cueing for activities related to improving balance, coordination, kinesthetic sense, posture, motor skill, proprioception  to assist with LE, proximal hip, and core control in self care, mobility, lifting, ambulation and eccentric single leg control.      NMR and Therapeutic Activities:    [] (34611 or 65422) Provided verbal/tactile cueing for activities related to improving balance, coordination, kinesthetic sense, posture, motor skill, proprioception and motor activation to allow for proper function of core, proximal hip and LE with self care and ADLs  [] (21717) Gait Re-education- Provided training and instruction to the patient for proper LE, core and proximal hip recruitment and positioning and eccentric body weight control with ambulation re-education including up and down stairs     Home Exercise Program:    [x] (30564) Reviewed/Progressed HEP activities related to strengthening, flexibility, endurance, ROM of core, proximal hip and LE for functional self-care, mobility, lifting and ambulation/stair navigation   [] (00608)Reviewed/Progressed HEP activities related to improving balance, coordination, kinesthetic sense, posture, motor skill, proprioception of core, proximal hip and LE for self care, mobility, lifting, and ambulation/stair navigation      Manual Treatments:  PROM / STM / Oscillations-Mobs:  G-I, II, III, IV (PA's, Inf., Post.)  [x] (68776) Provided manual therapy to mobilize LE, proximal hip and/or LS spine soft tissue/joints for the purpose of modulating pain, promoting relaxation,  increasing ROM, reducing/eliminating soft tissue swelling/inflammation/restriction, improving soft tissue extensibility and allowing for proper ROM for normal function with self care, mobility, lifting and ambulation. Modalities:     [] GAME READY (VASO)- for significant edema, swelling, pain control. Charges  Timed Code Treatment Minutes: 45   Total Treatment Minutes: 55       BWC time in/ time out: 12:45 - 1:40   TE time in /out 12:54 - 1:30   Manual time in/out 12:45 - 12:53   Neuro re ed time in/out    Untimed minutes 1:30 - 1:40       [] EVAL (LOW) 72373   [] EVAL (MOD) 21448  [] EVAL (HIGH) 43945   [] RE-EVAL     [x] QO(58105) x 2   [] IONTO  [] NMR (82078) x     [] VASO  [x] Manual (07537) x 1     [] Other:  [] TA x      [] Mech Traction (13936)  [] ES(attended) (35382)      [] ES (un) (78218):       GOALS:   Patient stated goal: strengthen, jog. Jump, doing downstairs. [] Progressing: [] Met: [] Not Met: [] Adjusted    Therapist goals for Patient:   Short Term Goals: To be achieved in: 2 weeks  1. Independent in HEP and progression per patient tolerance, in order to prevent re-injury. [] Progressing: [] Met: [] Not Met: [] Adjusted  2.  Patient will have a decrease in pain to facilitate improvement in movement, function, and ADLs as indicated by Functional Deficits. [] Progressing: [] Met: [] Not Met: [] Adjusted    Long Term Goals: To be achieved in: 6 weeks  1. Disability index score of 57 or more for FOTO to assist with reaching prior level of function. [] Progressing: [] Met: [] Not Met: [] Adjusted  2. Patient will demonstrate increased AROM to 130 to allow for proper joint functioning as indicated by patients Functional Deficits. [] Progressing: [] Met: [] Not Met: [] Adjusted  3. Patient will demonstrate an increase in Strength to good proximal hip strength and control, within 5lb HHD in LE to allow for proper functional mobility as indicated by patients Functional Deficits. [] Progressing: [] Met: [] Not Met: [] Adjusted  4. Patient will return to ascending and descending stairs with symmetrical, reciprocal gait without increased symptoms or restriction. [] Progressing: [] Met: [] Not Met: [] Adjusted  5. Able to run at work to diffuse conflict. [] Progressing: [] Met: [] Not Met: [] Adjusted  6. Able to work 13 hour shift without knee buckling/ giving way, swelling. Progression Towards Functional goals:  [] Patient is progressing as expected towards functional goals listed. [] Progression is slowed due to complexities listed. [] Progression has been slowed due to co-morbidities. [x] Plan just implemented, too soon to assess goals progression  [] Other:       Overall Progression Towards Functional goals/ Treatment Progress Update:  [] Patient is progressing as expected towards functional goals listed. [] Progression is slowed due to complexities/Impairments listed. [] Progression has been slowed due to co-morbidities.   [x] Plan just implemented, too soon to assess goals progression <30days   [] Goals require adjustment due to lack of progress  [] Patient is not progressing as expected and requires additional follow up with physician  [] Other    Prognosis for POC: [x] Good [] Fair  [] Poor      Patient requires continued skilled intervention: [x] Yes  [] No    Treatment/Activity Tolerance:  [x] Patient able to complete treatment  [] Patient limited by fatigue  [] Patient limited by pain    [] Patient limited by other medical complications  [] Other:     ASSESSMENT:  Pt did well with balance exercises. Add [place] and wall slides nv? PLAN: See eval  [x] Continue per plan of care [] Alter current plan (see comments above)  [] Plan of care initiated [] Hold pending MD visit [] Discharge      Electronically signed by:  Hetal Mi, PT    Note: If patient does not return for scheduled/ recommended follow up visits, this note will serve as a discharge from care along with most recent update on progress.

## 2022-04-14 ENCOUNTER — HOSPITAL ENCOUNTER (OUTPATIENT)
Dept: PHYSICAL THERAPY | Age: 47
Setting detail: THERAPIES SERIES
Discharge: HOME | End: 2022-04-14
Payer: COMMERCIAL

## 2022-04-14 PROCEDURE — 97110 THERAPEUTIC EXERCISES: CPT | Performed by: PHYSICAL THERAPIST

## 2022-04-14 PROCEDURE — 97140 MANUAL THERAPY 1/> REGIONS: CPT | Performed by: PHYSICAL THERAPIST

## 2022-04-14 PROCEDURE — 97016 VASOPNEUMATIC DEVICE THERAPY: CPT | Performed by: PHYSICAL THERAPIST

## 2022-04-14 NOTE — FLOWSHEET NOTE
[facility], [address]  Phone: [phone]  Fax [phone]    Physical Therapy Treatment Note/ Progress Report:           Date:  2022    Patient Name:  Vamsi Laboy    :  1975  MRN: [id number]  Restrictions/Precautions:    Medical/Treatment Diagnosis Information:  · Diagnosis: right knee strain S86.911D, right knee OA M17.11, right knee degenerative lateral meniscus  M23.301, right knee effusion M25.461  · Treatment Diagnosis: right knee OA M17.11, right knee strain R70.093W  Insurance/Certification information:  PT Insurance Information:  APPROVED FOR PHYSICAL THERAPY 2-3 X'S WEEK FOR 4-6 WEEKS 12 VISITS 3-- 2022  Physician Information:  Referring Practitioner: Dr. Jovany Mims  Has the plan of care been signed (Y/N):        []  Yes  [x]  No     Date of Patient follow up with Physician:       Is this a Progress Report:     []  Yes  [x]  No        If Yes:  Date Range for reporting period:  Beginning: 3/31/22  Endin22    Progress report will be due (10 Rx or 30 days whichever is less):        Recertification will be due (POC Duration  / 90 days whichever is less):       Visit # Insurance Allowable Auth Required   In-person  until 22 []  Yes []  No    Telehealth   []  Yes []  No    Total          Therapy Diagnosis/Practice Pattern:E      Number of Comorbidities:  []0     [x]1-2    []3+    Latex Allergy:  [x]NO      []YES  Language for Healthcare:   [x]English       []other:      SUBJECTIVE:  Pt only has pain on stairs    OBJECTIVE: See eval   Observation:    Test measurements:    ROM RIGHT current   Knee ext 0    Knee Flex 125    Ankle DF      Strength  RIGHT    HIP Flexors 5    HIP Abductors      Knee EXT (quad) 4+    Knee Flex (HS) 4    Ankle DF 5    Circumference    50.0 cm           Pain scale 2/10 1 / 10   Foto 44         RESTRICTIONS/PRECAUTIONS: OA    Exercises/Interventions:     Therapeutic Ex (55393 Sets/sec Reps Notes/CUES   bike  X 6 min           hep    supine and seated heel slides he0p   Seated HS s/  Seated gastroc s hep   clamshells lime  20 x     Bridges 5\"  20  x    Standing HR off step  20 x     DYLON  ext 60# 2 x 10     DYLON  Hip abd 60# 2 x 10       LSD 4 inch 2 x 10     Post glide  X 15     Leg press   Bilat/   ecc 130# x30 110# x 20     Incline stretch  20\"  3 x     HS curl 40# 2 x 10     Wall sits with TB abd lime X 20     Standing quad s :20 3x     Manual Intervention (28088)      Patellar mob , manual GS/ hS stretch, PROM of knee, prone quad stretch   X 8 min                                  NMR re-education (31355)   CUES NEEDED   LBW OVL 2 laps    Tandem stance on foam  2 x  1 min    SLS :30 3x                                         Therapeutic Activity (05271)                                                Therapeutic Exercise and NMR EXR  [x] (75078) Provided verbal/tactile cueing for activities related to strengthening, flexibility, endurance, ROM for improvements in LE, proximal hip, and core control with self care, mobility, lifting, ambulation.  [] (23465) Provided verbal/tactile cueing for activities related to improving balance, coordination, kinesthetic sense, posture, motor skill, proprioception  to assist with LE, proximal hip, and core control in self care, mobility, lifting, ambulation and eccentric single leg control.      NMR and Therapeutic Activities:    [] (49104 or 23160) Provided verbal/tactile cueing for activities related to improving balance, coordination, kinesthetic sense, posture, motor skill, proprioception and motor activation to allow for proper function of core, proximal hip and LE with self care and ADLs  [] (54276) Gait Re-education- Provided training and instruction to the patient for proper LE, core and proximal hip recruitment and positioning and eccentric body weight control with ambulation re-education including up and down stairs     Home Exercise Program:    [x] (69924) Reviewed/Progressed HEP activities related to strengthening, flexibility, endurance, ROM of core, proximal hip and LE for functional self-care, mobility, lifting and ambulation/stair navigation   [] (46891)Reviewed/Progressed HEP activities related to improving balance, coordination, kinesthetic sense, posture, motor skill, proprioception of core, proximal hip and LE for self care, mobility, lifting, and ambulation/stair navigation      Manual Treatments:  PROM / STM / Oscillations-Mobs:  G-I, II, III, IV (PA's, Inf., Post.)  [x] (66054) Provided manual therapy to mobilize LE, proximal hip and/or LS spine soft tissue/joints for the purpose of modulating pain, promoting relaxation,  increasing ROM, reducing/eliminating soft tissue swelling/inflammation/restriction, improving soft tissue extensibility and allowing for proper ROM for normal function with self care, mobility, lifting and ambulation. Modalities:     [x] GAME READY (VASO)- for significant edema, swelling, pain control. Charges  Timed Code Treatment Minutes: 45   Total Treatment Minutes: 55       BWC time in/ time out: 1:30 - 2:40   TE time in /out 1:45 - 2:25   Manual time in/out 1:30 - 1:38   Neuro re ed time in/out 1:38 - 1:45   Untimed minutes 2:25 - 2:40       [] EVAL (LOW) 90839   [] EVAL (MOD) 50565  [] EVAL (HIGH) 26289   [] RE-EVAL     [x] LQ(82856) x 2   [] IONTO  [] NMR (31877) x     [x] VASO  [x] Manual (04789) x 1     [] Other:  [] TA x      [] Mech Traction (42435)  [] ES(attended) (49906)      [] ES (un) (97778):       GOALS:   Patient stated goal: strengthen, jog. Jump, doing downstairs. [] Progressing: [] Met: [] Not Met: [] Adjusted    Therapist goals for Patient:   Short Term Goals: To be achieved in: 2 weeks  1. Independent in HEP and progression per patient tolerance, in order to prevent re-injury. [] Progressing: [] Met: [] Not Met: [] Adjusted  2. Patient will have a decrease in pain to facilitate improvement in movement, function, and ADLs as indicated by Functional Deficits. [] Progressing: [] Met: [] Not Met: [] Adjusted    Long Term Goals: To be achieved in: 6 weeks  1. Disability index score of 57 or more for FOTO to assist with reaching prior level of function. [] Progressing: [] Met: [] Not Met: [] Adjusted  2. Patient will demonstrate increased AROM to 130 to allow for proper joint functioning as indicated by patients Functional Deficits. [] Progressing: [] Met: [] Not Met: [] Adjusted  3. Patient will demonstrate an increase in Strength to good proximal hip strength and control, within 5lb HHD in LE to allow for proper functional mobility as indicated by patients Functional Deficits. [] Progressing: [] Met: [] Not Met: [] Adjusted  4. Patient will return to ascending and descending stairs with symmetrical, reciprocal gait without increased symptoms or restriction. [] Progressing: [] Met: [] Not Met: [] Adjusted  5. Able to run at work to diffuse conflict. [] Progressing: [] Met: [] Not Met: [] Adjusted  6. Able to work 13 hour shift without knee buckling/ giving way, swelling. Progression Towards Functional goals:  [x] Patient is progressing as expected towards functional goals listed. [] Progression is slowed due to complexities listed. [] Progression has been slowed due to co-morbidities. [] Plan just implemented, too soon to assess goals progression  [] Other:       Overall Progression Towards Functional goals/ Treatment Progress Update:  [x] Patient is progressing as expected towards functional goals listed. [] Progression is slowed due to complexities/Impairments listed. [] Progression has been slowed due to co-morbidities.   [] Plan just implemented, too soon to assess goals progression <30days   [] Goals require adjustment due to lack of progress  [] Patient is not progressing as expected and requires additional follow up with physician  [] Other    Prognosis for POC: [x] Good [] Fair  [] Poor      Patient requires continued skilled intervention: [x] Yes  [] No    Treatment/Activity Tolerance:  [x] Patient able to complete treatment  [] Patient limited by fatigue  [] Patient limited by pain    [] Patient limited by other medical complications  [] Other:     ASSESSMENT:  Pt was fatigued and sore with increased strengthening exercises. Added VAso to address the increased soreness. PLAN: See eval  [x] Continue per plan of care [] Alter current plan (see comments above)  [] Plan of care initiated [] Hold pending MD visit [] Discharge      Electronically signed by:  Davonte Kirby, PT    Note: If patient does not return for scheduled/ recommended follow up visits, this note will serve as a discharge from care along with most recent update on progress.

## 2022-04-18 ENCOUNTER — HOSPITAL ENCOUNTER (OUTPATIENT)
Dept: PHYSICAL THERAPY | Age: 47
Setting detail: THERAPIES SERIES
Discharge: HOME | End: 2022-04-18
Payer: COMMERCIAL

## 2022-04-18 ENCOUNTER — TELEPHONE (OUTPATIENT)
Dept: ORTHOPEDIC SURGERY | Age: 47
End: 2022-04-18

## 2022-04-18 PROCEDURE — 97140 MANUAL THERAPY 1/> REGIONS: CPT | Performed by: PHYSICAL THERAPIST

## 2022-04-18 PROCEDURE — 97110 THERAPEUTIC EXERCISES: CPT | Performed by: PHYSICAL THERAPIST

## 2022-04-18 PROCEDURE — 97035 APP MDLTY 1+ULTRASOUND EA 15: CPT | Performed by: PHYSICAL THERAPIST

## 2022-04-18 NOTE — TELEPHONE ENCOUNTER
FANG TURPIN failed to sign the necessary C86 consent form to have DX S86.911D, M23.302, M25.461,M17.11 added to her claim. The form was offered to her via E-mail, fax or she could go to the office to sign. She chose to sign in the office on 4/11/22 but did not do so. A VM was left letting her know if she still would like to pursue having the DX added she can still sign the scanned form and inform the [facility] dept.

## 2022-04-18 NOTE — FLOWSHEET NOTE
[facility], [address]  Phone: [phone]  Fax [phone]    Physical Therapy Treatment Note/ Progress Report:           Date:  2022    Patient Name:  Mert Lebron    :  1975  MRN: [id number]  Restrictions/Precautions:    Medical/Treatment Diagnosis Information:  · Diagnosis: right knee strain S86.911D, right knee OA M17.11, right knee degenerative lateral meniscus  M23.301, right knee effusion M25.461  · Treatment Diagnosis: right knee OA M17.11, right knee strain K85.105I  Insurance/Certification information:  PT Insurance Information:  APPROVED FOR PHYSICAL THERAPY 2-3 X'S WEEK FOR 4-6 WEEKS 12 VISITS 3-- 2022  Physician Information:  Referring Practitioner: Dr. Olive Schultz  Has the plan of care been signed (Y/N):        []  Yes  [x]  No     Date of Patient follow up with Physician:       Is this a Progress Report:     []  Yes  [x]  No        If Yes:  Date Range for reporting period:  Beginning: 3/31/22  Endin22    Progress report will be due (10 Rx or 30 days whichever is less):        Recertification will be due (POC Duration  / 90 days whichever is less):       Visit # Insurance Allowable Auth Required   In-person  until 22 []  Yes []  No    Telehealth   []  Yes []  No    Total          Therapy Diagnosis/Practice Pattern:E      Number of Comorbidities:  []0     [x]1-2    []3+    Latex Allergy:  [x]NO      []YES  Language for Healthcare:   [x]English       []other:    SUBJECTIVE: Pt has inferior knee pain when ascending and descending stairs.      OBJECTIVE: See eval   Observation:    Test measurements:    ROM RIGHT current   Knee ext 0    Knee Flex 125    Ankle DF      Strength  RIGHT    HIP Flexors 5    HIP Abductors      Knee EXT (quad) 4+    Knee Flex (HS) 4    Ankle DF 5    Circumference    50.0 cm           Pain scale 2/10 1 / 10   Foto 44 See message below.   Ok to switch?  Please advise.   Order pended.         RESTRICTIONS/PRECAUTIONS: OA    Exercises/Interventions:     Therapeutic Ex (74777 Sets/sec Reps Notes/CUES   bike  X 6 min     Prone quad s :30 5 x     hep   Prone hip ext/  Prone TKE 2 x 10 X 10 :05    Seated HS s/  Seated gastroc s hep   clamshells lime  20 x     Bridges 5\"  20  x    Standing HR off step  20 x     DYLON  ext 60# 2 x 10     DYLON  Hip abd 60# 2 x 10       LSD 4 inch 2 x 10     Post glide  X 15     Leg press   Bilat/   ecc 130# x30 110# x 20     Incline stretch  20\"  3 x     HS curl 40# 2 x 10     Wall sits with TB abd lime X 20     Standing quad s :20 3x     Manual Intervention (64239)      Patellar mob , manual GS/ hS stretch, PROM of knee, prone quad stretch   X 8 min          US 50%/1.2  8 min  patella tendon. NMR re-education (63984)   CUES NEEDED   LBW OVL 2 laps    Tandem stance on foam  2 x  1 min    SLS :30 3x     Prone plank :20 3x                                   Therapeutic Activity (74412)                                                Therapeutic Exercise and NMR EXR  [x] (52721) Provided verbal/tactile cueing for activities related to strengthening, flexibility, endurance, ROM for improvements in LE, proximal hip, and core control with self care, mobility, lifting, ambulation.  [] (50301) Provided verbal/tactile cueing for activities related to improving balance, coordination, kinesthetic sense, posture, motor skill, proprioception  to assist with LE, proximal hip, and core control in self care, mobility, lifting, ambulation and eccentric single leg control.      NMR and Therapeutic Activities:    [] (06663 or 20996) Provided verbal/tactile cueing for activities related to improving balance, coordination, kinesthetic sense, posture, motor skill, proprioception and motor activation to allow for proper function of core, proximal hip and LE with self care and ADLs  [] (40298) Gait Re-education- Provided training and instruction to the patient for proper LE, core and proximal hip recruitment and positioning and eccentric body weight control with ambulation re-education including up and down stairs     Home Exercise Program:    [x] (86161) Reviewed/Progressed HEP activities related to strengthening, flexibility, endurance, ROM of core, proximal hip and LE for functional self-care, mobility, lifting and ambulation/stair navigation   [] (37708)Reviewed/Progressed HEP activities related to improving balance, coordination, kinesthetic sense, posture, motor skill, proprioception of core, proximal hip and LE for self care, mobility, lifting, and ambulation/stair navigation      Manual Treatments:  PROM / STM / Oscillations-Mobs:  G-I, II, III, IV (PA's, Inf., Post.)  [x] (21308) Provided manual therapy to mobilize LE, proximal hip and/or LS spine soft tissue/joints for the purpose of modulating pain, promoting relaxation,  increasing ROM, reducing/eliminating soft tissue swelling/inflammation/restriction, improving soft tissue extensibility and allowing for proper ROM for normal function with self care, mobility, lifting and ambulation. Modalities:     [x] GAME READY (VASO)- for significant edema, swelling, pain control. Charges  Timed Code Treatment Minutes: 55   Total Treatment Minutes: 70       BWC time in/ time out: 1:30 - 2:40   TE time in /out 1:47 - 2:25   Manual time in/out 1:30 - 1:38   Neuro re ed time in/out    Ultrasound 1:38 - 1:46   Untimed minutes 2;25 - 2:40 ICE       [] EVAL (LOW) 20760   [] EVAL (MOD) 20769  [] EVAL (HIGH) 50308   [] RE-EVAL     [x] XP(37969) x 2   [] IONTO  [] NMR (08014) x     [] VASO  [x] Manual (26630) x 1     [x] Other: us  [] TA x      [] Mech Traction (53171)  [] ES(attended) (52920)      [] ES (un) (04406):       GOALS:   Patient stated goal: strengthen, jog. Jump, doing downstairs. [] Progressing: [] Met: [] Not Met: [] Adjusted    Therapist goals for Patient:   Short Term Goals: To be achieved in: 2 weeks  1.  Independent in HEP and progression per patient tolerance, in order to prevent re-injury. [] Progressing: [] Met: [] Not Met: [] Adjusted  2. Patient will have a decrease in pain to facilitate improvement in movement, function, and ADLs as indicated by Functional Deficits. [] Progressing: [] Met: [] Not Met: [] Adjusted    Long Term Goals: To be achieved in: 6 weeks  1. Disability index score of 57 or more for FOTO to assist with reaching prior level of function. [] Progressing: [] Met: [] Not Met: [] Adjusted  2. Patient will demonstrate increased AROM to 130 to allow for proper joint functioning as indicated by patients Functional Deficits. [] Progressing: [] Met: [] Not Met: [] Adjusted  3. Patient will demonstrate an increase in Strength to good proximal hip strength and control, within 5lb HHD in LE to allow for proper functional mobility as indicated by patients Functional Deficits. [] Progressing: [] Met: [] Not Met: [] Adjusted  4. Patient will return to ascending and descending stairs with symmetrical, reciprocal gait without increased symptoms or restriction. [] Progressing: [] Met: [] Not Met: [] Adjusted  5. Able to run at work to diffuse conflict. [] Progressing: [] Met: [] Not Met: [] Adjusted  6. Able to work 13 hour shift without knee buckling/ giving way, swelling. Progression Towards Functional goals:  [x] Patient is progressing as expected towards functional goals listed. [] Progression is slowed due to complexities listed. [] Progression has been slowed due to co-morbidities. [] Plan just implemented, too soon to assess goals progression  [] Other:       Overall Progression Towards Functional goals/ Treatment Progress Update:  [x] Patient is progressing as expected towards functional goals listed. [] Progression is slowed due to complexities/Impairments listed. [] Progression has been slowed due to co-morbidities.   [] Plan just implemented, too soon to assess goals progression <30days [] Goals require adjustment due to lack of progress  [] Patient is not progressing as expected and requires additional follow up with physician  [] Other    Prognosis for POC: [x] Good [] Fair  [] Poor      Patient requires continued skilled intervention: [x] Yes  [] No    Treatment/Activity Tolerance:  [x] Patient able to complete treatment  [] Patient limited by fatigue  [] Patient limited by pain    [] Patient limited by other medical complications  [] Other:     ASSESSMENT:  Utilized US over patella tendon to address inf knee pain, along with quad stretching and core/ glut strengthening. PLAN: See eval  [x] Continue per plan of care [] Alter current plan (see comments above)  [] Plan of care initiated [] Hold pending MD visit [] Discharge      Electronically signed by:  Madelaine Barry PT    Note: If patient does not return for scheduled/ recommended follow up visits, this note will serve as a discharge from care along with most recent update on progress.

## 2022-04-21 ENCOUNTER — HOSPITAL ENCOUNTER (OUTPATIENT)
Dept: PHYSICAL THERAPY | Age: 47
Setting detail: THERAPIES SERIES
Discharge: HOME | End: 2022-04-21
Payer: COMMERCIAL

## 2022-04-21 PROCEDURE — 97110 THERAPEUTIC EXERCISES: CPT | Performed by: PHYSICAL THERAPIST

## 2022-04-21 PROCEDURE — 97140 MANUAL THERAPY 1/> REGIONS: CPT | Performed by: PHYSICAL THERAPIST

## 2022-04-21 PROCEDURE — 97016 VASOPNEUMATIC DEVICE THERAPY: CPT | Performed by: PHYSICAL THERAPIST

## 2022-04-21 NOTE — FLOWSHEET NOTE
[facility], [address]  Phone: [phone]  Fax [phone]    Physical Therapy Treatment Note/ Progress Report:           Date:  2022    Patient Name:  Junaid Flores    :  1975  MRN: [id number]  Restrictions/Precautions:    Medical/Treatment Diagnosis Information:  · Diagnosis: right knee strain S86.911D, right knee OA M17.11, right knee degenerative lateral meniscus  M23.301, right knee effusion M25.461  · Treatment Diagnosis: right knee OA M17.11, right knee strain X31.729N  Insurance/Certification information:  PT Insurance Information:  APPROVED FOR PHYSICAL THERAPY 2-3 X'S WEEK FOR 4-6 WEEKS 12 VISITS 3-- 2022  Physician Information:  Referring Practitioner: Dr. Blade Silveira  Has the plan of care been signed (Y/N):        []  Yes  [x]  No     Date of Patient follow up with Physician:       Is this a Progress Report:     []  Yes  [x]  No        If Yes:  Date Range for reporting period:  Beginning: 3/31/22  Endin22    Progress report will be due (10 Rx or 30 days whichever is less):        Recertification will be due (POC Duration  / 90 days whichever is less):       Visit # Insurance Allowable Auth Required   In-person  until 22 []  Yes []  No    Telehealth   []  Yes []  No    Total          Therapy Diagnosis/Practice Pattern:E      Number of Comorbidities:  []0     [x]1-2    []3+    Latex Allergy:  [x]NO      []YES  Language for Healthcare:   [x]English       []other:    SUBJECTIVE: Pt rates her pain a 3-4/ 10 today. Thinks it is increased from doing yardwork. Pt states that her whole body is sore.      OBJECTIVE: See eval   Observation:    Test measurements:    ROM RIGHT current   Knee ext 0    Knee Flex 125    Ankle DF      Strength  RIGHT    HIP Flexors 5    HIP Abductors      Knee EXT (quad) 4+    Knee Flex (HS) 4    Ankle DF 5    Circumference    50.0 cm        Pain scale 2/10 3-4 / 10   Foto 44         RESTRICTIONS/PRECAUTIONS: OA    Exercises/Interventions:     Therapeutic Ex (97436 Sets/sec Reps Notes/CUES   bike  X 6 min     Prone quad s :30 5 x     SLR  Flex, abd  2 x 10 hep   Supine HS s/  Supine ITB s :30 3x    Supine mod fig 4/  Supine piriformis 3 x :20 3x :20    Prone hip ext/  Prone TKE 2 x 10 X 10 :05    Seated HS s/  Seated gastroc s hep   clamshells lime  20 x     Bridges 5\"  20  x    Standing HR off step  20 x     DYLON  ext 60# 2 x 10     DYLON  Hip abd 60# 2 x 10       LSD 4 inch 2 x 10     Post glide  X 15     Leg press   Bilat/   ecc 130# x30     Incline stretch  20\"  3 x     HS curl 40# 2 x 10     Wall sits with TB abd lime X 20     Standing quad s :20 3x     Manual Intervention (85284)      Patellar mob , manual GS/ hS stretch, PROM of knee, prone quad stretch   X 3 min    Roller to ITB and piriformis  5 min    US 50%/1.2  8 min  patella tendon. NMR re-education (87548)   CUES NEEDED   LBW OVL 2 laps    Tandem stance on foam  2 x  1 min    SLS :30 3x     Prone plank :20 3x                                   Therapeutic Activity (63625)                                                Therapeutic Exercise and NMR EXR  [x] (46743) Provided verbal/tactile cueing for activities related to strengthening, flexibility, endurance, ROM for improvements in LE, proximal hip, and core control with self care, mobility, lifting, ambulation.  [] (18995) Provided verbal/tactile cueing for activities related to improving balance, coordination, kinesthetic sense, posture, motor skill, proprioception  to assist with LE, proximal hip, and core control in self care, mobility, lifting, ambulation and eccentric single leg control.      NMR and Therapeutic Activities:    [] (12067 or 95655) Provided verbal/tactile cueing for activities related to improving balance, coordination, kinesthetic sense, posture, motor skill, proprioception and motor activation to allow for proper function of core, proximal hip and LE with self care and ADLs  [] (01707) Gait Re-education- Provided training and instruction to the patient for proper LE, core and proximal hip recruitment and positioning and eccentric body weight control with ambulation re-education including up and down stairs     Home Exercise Program:    [x] (45114) Reviewed/Progressed HEP activities related to strengthening, flexibility, endurance, ROM of core, proximal hip and LE for functional self-care, mobility, lifting and ambulation/stair navigation   [] (40016)Reviewed/Progressed HEP activities related to improving balance, coordination, kinesthetic sense, posture, motor skill, proprioception of core, proximal hip and LE for self care, mobility, lifting, and ambulation/stair navigation      Manual Treatments:  PROM / STM / Oscillations-Mobs:  G-I, II, III, IV (PA's, Inf., Post.)  [x] (67464) Provided manual therapy to mobilize LE, proximal hip and/or LS spine soft tissue/joints for the purpose of modulating pain, promoting relaxation,  increasing ROM, reducing/eliminating soft tissue swelling/inflammation/restriction, improving soft tissue extensibility and allowing for proper ROM for normal function with self care, mobility, lifting and ambulation. Modalities:     [x] GAME READY (VASO)- for significant edema, swelling, pain control.      Charges  Timed Code Treatment Minutes: 45   Total Treatment Minutes: 60       BWC time in/ time out: 1:30 - 2:30   TE time in /out 1:45 - 2:15   Manual time in/out 1:30 - 1:38   Neuro re ed time in/out 1:38 -1:40   Ultrasound    Untimed minutes 2:15 - 2:30       [] EVAL (LOW) 97483   [] EVAL (MOD) 42771  [] EVAL (HIGH) 70456   [] RE-EVAL     [x] JT(39739) x 2   [] IONTO  [] NMR (85027) x     [] VASO  [x] Manual (84459) x 1     [x] Other: us  [] TA x      [] Mech Traction (96177)  [] ES(attended) (48172)      [] ES (un) (05336):       GOALS:   Patient stated goal: strengthen, jog. Jump, doing downstairs. [] Progressing: [] Met: [] Not Met: [] Adjusted    Therapist goals for Patient:   Short Term Goals: To be achieved in: 2 weeks  1. Independent in HEP and progression per patient tolerance, in order to prevent re-injury. [] Progressing: [] Met: [] Not Met: [] Adjusted  2. Patient will have a decrease in pain to facilitate improvement in movement, function, and ADLs as indicated by Functional Deficits. [] Progressing: [] Met: [] Not Met: [] Adjusted    Long Term Goals: To be achieved in: 6 weeks  1. Disability index score of 57 or more for FOTO to assist with reaching prior level of function. [] Progressing: [] Met: [] Not Met: [] Adjusted  2. Patient will demonstrate increased AROM to 130 to allow for proper joint functioning as indicated by patients Functional Deficits. [] Progressing: [] Met: [] Not Met: [] Adjusted  3. Patient will demonstrate an increase in Strength to good proximal hip strength and control, within 5lb HHD in LE to allow for proper functional mobility as indicated by patients Functional Deficits. [] Progressing: [] Met: [] Not Met: [] Adjusted  4. Patient will return to ascending and descending stairs with symmetrical, reciprocal gait without increased symptoms or restriction. [] Progressing: [] Met: [] Not Met: [] Adjusted  5. Able to run at work to diffuse conflict. [] Progressing: [] Met: [] Not Met: [] Adjusted  6. Able to work 13 hour shift without knee buckling/ giving way, swelling. Progression Towards Functional goals:  [x] Patient is progressing as expected towards functional goals listed. [] Progression is slowed due to complexities listed. [] Progression has been slowed due to co-morbidities. [] Plan just implemented, too soon to assess goals progression  [] Other:       Overall Progression Towards Functional goals/ Treatment Progress Update:  [x] Patient is progressing as expected towards functional goals listed. [] Progression is slowed due to complexities/Impairments listed. [] Progression has been slowed due to co-morbidities. [] Plan just implemented, too soon to assess goals progression <30days   [] Goals require adjustment due to lack of progress  [] Patient is not progressing as expected and requires additional follow up with physician  [] Other    Prognosis for POC: [x] Good [] Fair  [] Poor      Patient requires continued skilled intervention: [x] Yes  [] No    Treatment/Activity Tolerance:  [x] Patient able to complete treatment  [] Patient limited by fatigue  [] Patient limited by pain    [] Patient limited by other medical complications  [] Other:     ASSESSMENT: Added Vaso to address knee pain and ice pack for piriformis. Focused on hamstring/ piriformis to improve gait. PLAN: See eval  [x] Continue per plan of care [] Alter current plan (see comments above)  [] Plan of care initiated [] Hold pending MD visit [] Discharge      Electronically signed by:  Nicole Franklin PT    Note: If patient does not return for scheduled/ recommended follow up visits, this note will serve as a discharge from care along with most recent update on progress.

## 2022-04-25 ENCOUNTER — HOSPITAL ENCOUNTER (OUTPATIENT)
Dept: PHYSICAL THERAPY | Age: 47
Setting detail: THERAPIES SERIES
Discharge: HOME | End: 2022-04-25
Payer: COMMERCIAL

## 2022-04-25 PROCEDURE — 97110 THERAPEUTIC EXERCISES: CPT | Performed by: PHYSICAL THERAPIST

## 2022-04-25 PROCEDURE — 97112 NEUROMUSCULAR REEDUCATION: CPT | Performed by: PHYSICAL THERAPIST

## 2022-04-25 PROCEDURE — 97140 MANUAL THERAPY 1/> REGIONS: CPT | Performed by: PHYSICAL THERAPIST

## 2022-04-25 NOTE — FLOWSHEET NOTE
[facility], [address]  Phone: [phone]  Fax [phone]    Physical Therapy Treatment Note/ Progress Report:           Date:  2022    Patient Name:  Elba Myles    :  1975  MRN: [id number]  Restrictions/Precautions:    Medical/Treatment Diagnosis Information:  · Diagnosis: right knee strain S86.911D, right knee OA M17.11, right knee degenerative lateral meniscus  M23.301, right knee effusion M25.461  · Treatment Diagnosis: right knee OA M17.11, right knee strain L99.350A  Insurance/Certification information:  PT Insurance Information: [id number] APPROVED FOR PHYSICAL THERAPY 2-3 X'S WEEK FOR 4-6 WEEKS 12 VISITS 3-- 2022  Physician Information:  Referring Practitioner: Dr. Tanvir Aguirre  Has the plan of care been signed (Y/N):        []  Yes  [x]  No     Date of Patient follow up with Physician: nsv      Is this a Progress Report:     []  Yes  [x]  No        If Yes:  Date Range for reporting period:  Beginning: 3/31/22  Endin22    Progress report will be due (10 Rx or 30 days whichever is less):        Recertification will be due (POC Duration  / 90 days whichever is less):       Visit # Insurance Allowable Auth Required   In-person  until 22 []  Yes []  No    Telehealth   []  Yes []  No    Total          Therapy Diagnosis/Practice Pattern:E      Number of Comorbidities:  []0     [x]1-2    []3+    Latex Allergy:  [x]NO      []YES  Language for Healthcare:   [x]English       []other:    SUBJECTIVE: Pt thinks the warm weather is helping her joints. Pt was able to till without knee pain. Pt does work tonight.      OBJECTIVE: See eval   Observation:    Test measurements:    ROM RIGHT current   Knee ext 0    Knee Flex 125    Ankle DF      Strength  RIGHT    HIP Flexors 5    HIP Abductors      Knee EXT (quad) 4+    Knee Flex (HS) 4    Ankle DF 5    Circumference    50.0 cm        Pain scale 2/10 3-4 / 10   Foto 44         RESTRICTIONS/PRECAUTIONS: OA    Exercises/Interventions:     Therapeutic Ex (90606 Sets/sec Reps Notes/CUES   bike  X 6 min     Prone quad s :30 5 x     Supine HS s/  Supine ITB s :30 3x    Supine mod fig 4/  Supine piriformis 3 x :20 3x :20       Seated HS s/  Seated gastroc s hep   clamshells lime  20 x     Bridges 5\"  20  x    Standing HR off step  20 x        DYLON  Hip abd 60# 2 x 10       LSD 4 inch 2 x 10     Post glide  X 15     Leg press   Bilat/   ecc 130# x30     Incline stretch  20\"  3 x     HS curl 40# 2 x 10     Wall sits with TB abd lime X 20     Standing quad s :20 3x     Manual Intervention (04803)      Patellar mob , manual GS/ hS stretch, PROM of knee, prone quad stretch   X 3 min    Roller to ITB and piriformis  5 min     patella tendon. NMR re-education (16469)   CUES NEEDED   LBW OVL 2 laps    Tandem stance on foam     SLS  foam :30 3x     Prone plank :20 3x                                   Therapeutic Activity (03197)                                                Therapeutic Exercise and NMR EXR  [x] (97283) Provided verbal/tactile cueing for activities related to strengthening, flexibility, endurance, ROM for improvements in LE, proximal hip, and core control with self care, mobility, lifting, ambulation.  [] (80066) Provided verbal/tactile cueing for activities related to improving balance, coordination, kinesthetic sense, posture, motor skill, proprioception  to assist with LE, proximal hip, and core control in self care, mobility, lifting, ambulation and eccentric single leg control.      NMR and Therapeutic Activities:    [] (15562 or 57853) Provided verbal/tactile cueing for activities related to improving balance, coordination, kinesthetic sense, posture, motor skill, proprioception and motor activation to allow for proper function of core, proximal hip and LE with self care and ADLs  [] (29969) Gait Re-education- Provided training and instruction to the patient for proper LE, core and proximal hip recruitment and positioning and eccentric body weight control with ambulation re-education including up and down stairs     Home Exercise Program:    [x] (03976) Reviewed/Progressed HEP activities related to strengthening, flexibility, endurance, ROM of core, proximal hip and LE for functional self-care, mobility, lifting and ambulation/stair navigation   [] (62387)Reviewed/Progressed HEP activities related to improving balance, coordination, kinesthetic sense, posture, motor skill, proprioception of core, proximal hip and LE for self care, mobility, lifting, and ambulation/stair navigation      Manual Treatments:  PROM / STM / Oscillations-Mobs:  G-I, II, III, IV (PA's, Inf., Post.)  [x] (69538) Provided manual therapy to mobilize LE, proximal hip and/or LS spine soft tissue/joints for the purpose of modulating pain, promoting relaxation,  increasing ROM, reducing/eliminating soft tissue swelling/inflammation/restriction, improving soft tissue extensibility and allowing for proper ROM for normal function with self care, mobility, lifting and ambulation. Modalities:     [x] GAME READY (VASO)- for significant edema, swelling, pain control. Charges  Timed Code Treatment Minutes: 45   Total Treatment Minutes: 60       BWC time in/ time out: 1:30 - 2:30   TE time in /out 1:45 - 2:15   Manual time in/out 1:30 - 1:38   Neuro re ed time in/out 1:38 -1:40   Ultrasound    Untimed minutes 2:15 - 2:30       [] EVAL (LOW) 35297   [] EVAL (MOD) 87048  [] EVAL (HIGH) 19939   [] RE-EVAL     [x] VN(55623) x 1   [] IONTO  [x] NMR (48187) x  1   [] VASO  [x] Manual (22086) x 1     [x] Other: ice  [] TA x      [] Mech Traction (30631)  [] ES(attended) (16556)      [] ES (un) (70108):       GOALS:   Patient stated goal: strengthen, jog. Jump, doing downstairs.    [] Progressing: [] Met: [] Not Met: [] Adjusted    Therapist goals for Patient:   Short Term Goals: To be achieved in: 2 weeks  1. Independent in HEP and progression per patient tolerance, in order to prevent re-injury. [] Progressing: [] Met: [] Not Met: [] Adjusted  2. Patient will have a decrease in pain to facilitate improvement in movement, function, and ADLs as indicated by Functional Deficits. [] Progressing: [] Met: [] Not Met: [] Adjusted    Long Term Goals: To be achieved in: 6 weeks  1. Disability index score of 57 or more for FOTO to assist with reaching prior level of function. [] Progressing: [] Met: [] Not Met: [] Adjusted  2. Patient will demonstrate increased AROM to 130 to allow for proper joint functioning as indicated by patients Functional Deficits. [] Progressing: [] Met: [] Not Met: [] Adjusted  3. Patient will demonstrate an increase in Strength to good proximal hip strength and control, within 5lb HHD in LE to allow for proper functional mobility as indicated by patients Functional Deficits. [] Progressing: [] Met: [] Not Met: [] Adjusted  4. Patient will return to ascending and descending stairs with symmetrical, reciprocal gait without increased symptoms or restriction. [] Progressing: [] Met: [] Not Met: [] Adjusted  5. Able to run at work to diffuse conflict. [] Progressing: [] Met: [] Not Met: [] Adjusted  6. Able to work 13 hour shift without knee buckling/ giving way, swelling. Progression Towards Functional goals:  [x] Patient is progressing as expected towards functional goals listed. [] Progression is slowed due to complexities listed. [] Progression has been slowed due to co-morbidities. [] Plan just implemented, too soon to assess goals progression  [] Other:       Overall Progression Towards Functional goals/ Treatment Progress Update:  [x] Patient is progressing as expected towards functional goals listed. [] Progression is slowed due to complexities/Impairments listed. [] Progression has been slowed due to co-morbidities.   [] Plan just implemented, too soon to assess goals progression <30days   [] Goals require adjustment due to lack of progress  [] Patient is not progressing as expected and requires additional follow up with physician  [] Other    Prognosis for POC: [x] Good [] Fair  [] Poor      Patient requires continued skilled intervention: [x] Yes  [] No    Treatment/Activity Tolerance:  [x] Patient able to complete treatment  [] Patient limited by fatigue  [] Patient limited by pain    [] Patient limited by other medical complications  [] Other:     ASSESSMENT: Pt was able to complete strengthening program.  Pt's ROM was WNL       PLAN: See eval  [x] Continue per plan of care [] Alter current plan (see comments above)  [] Plan of care initiated [] Hold pending MD visit [] Discharge      Electronically signed by:  Vi Perales PT    Note: If patient does not return for scheduled/ recommended follow up visits, this note will serve as a discharge from care along with most recent update on progress.

## 2022-04-28 ENCOUNTER — HOSPITAL ENCOUNTER (OUTPATIENT)
Dept: PHYSICAL THERAPY | Age: 47
Setting detail: THERAPIES SERIES
Discharge: HOME | End: 2022-04-28
Payer: COMMERCIAL

## 2022-04-28 PROCEDURE — 97110 THERAPEUTIC EXERCISES: CPT | Performed by: PHYSICAL THERAPIST

## 2022-04-28 PROCEDURE — 97140 MANUAL THERAPY 1/> REGIONS: CPT | Performed by: PHYSICAL THERAPIST

## 2022-04-28 PROCEDURE — 97016 VASOPNEUMATIC DEVICE THERAPY: CPT | Performed by: PHYSICAL THERAPIST

## 2022-04-28 NOTE — FLOWSHEET NOTE
[facility], [address]  Phone: [phone]  Fax [phone]    Physical Therapy Treatment Note/ Progress Report:           Date:  2022    Patient Name:  Checo Reyes    :  1975  MRN: [id number]  Restrictions/Precautions:    Medical/Treatment Diagnosis Information:  · Diagnosis: right knee strain S86.911D, right knee OA M17.11, right knee degenerative lateral meniscus  M23.301, right knee effusion M25.461  · Treatment Diagnosis: right knee OA M17.11, right knee strain L25.831F  Insurance/Certification information:  PT Insurance Information:  APPROVED FOR PHYSICAL THERAPY 2-3 X'S WEEK FOR 4-6 WEEKS 12 VISITS 3-- 2022  Physician Information:  Referring Practitioner: Dr. Windy Chen  Has the plan of care been signed (Y/N):        []  Yes  [x]  No     Date of Patient follow up with Physician: nsv      Is this a Progress Report:     []  Yes  [x]  No        If Yes:  Date Range for reporting period:  Beginning: 3/31/22  Endin22    Progress report will be due (10 Rx or 30 days whichever is less):        Recertification will be due (POC Duration  / 90 days whichever is less):       Visit # Insurance Allowable Auth Required   In-person  until 22 []  Yes []  No    Telehealth   []  Yes []  No    Total          Therapy Diagnosis/Practice Pattern:E      Number of Comorbidities:  []0     [x]1-2    []3+    Latex Allergy:  [x]NO      []YES  Language for Healthcare:   [x]English       []other:    SUBJECTIVE: Pt thinks the warm weather is helping her joints. Pt was able to till without knee pain. Pt does work tonight.      OBJECTIVE: See eval   Observation:    Test measurements:    ROM RIGHT current   Knee ext 0    Knee Flex 125    Ankle DF      Strength  RIGHT    HIP Flexors 5    HIP Abductors      Knee EXT (quad) 4+    Knee Flex (HS) 4    Ankle DF 5    Circumference    50.0 cm        Pain scale 2/10 3-4 / 10   Foto 44         RESTRICTIONS/PRECAUTIONS: OA    Exercises/Interventions:     Therapeutic Ex (57205 Sets/sec Reps Notes/CUES   Bike/ elliptical 5 min 4 min    Prone quad s :30 5 x     Supine HS s/  Supine ITB s :30 3x    Supine mod fig 4/  Supine piriformis 3 x :20 3x :20       Seated HS s/  Seated gastroc s hep   clamshells lime  20 x     Bridges 5\"  20  x    Standing HR off step  20 x        DYLON  Hip abd 60# 2 x 10    LSD 4 inch 2 x 10     Post glide  X 15     Leg press   Bilat/   ecc 130# x30     Incline stretch  20\"  3 x     HS curl 40# 2 x 10     Wall sits with TB abd lime X 20     Standing quad s :20 3x     Manual Intervention (25268)      Patellar mob , manual GS/ hS stretch, PROM of knee, prone quad stretch   X 3 min    Roller to ITB and piriformis  5 min     patella tendon. NMR re-education (07896)   CUES NEEDED   LBW LVL 2 laps    Tandem stance on foam     SLS  foam :30 3x     Prone plank :20 3x                                   Therapeutic Activity (26289)                                                Therapeutic Exercise and NMR EXR  [x] (16729) Provided verbal/tactile cueing for activities related to strengthening, flexibility, endurance, ROM for improvements in LE, proximal hip, and core control with self care, mobility, lifting, ambulation.  [] (39392) Provided verbal/tactile cueing for activities related to improving balance, coordination, kinesthetic sense, posture, motor skill, proprioception  to assist with LE, proximal hip, and core control in self care, mobility, lifting, ambulation and eccentric single leg control.      NMR and Therapeutic Activities:    [] (60354 or 82047) Provided verbal/tactile cueing for activities related to improving balance, coordination, kinesthetic sense, posture, motor skill, proprioception and motor activation to allow for proper function of core, proximal hip and LE with self care and ADLs  [] (02689) Gait Re-education- Provided training and instruction to the patient for proper LE, core and proximal hip recruitment and positioning and eccentric body weight control with ambulation re-education including up and down stairs     Home Exercise Program:    [x] (94114) Reviewed/Progressed HEP activities related to strengthening, flexibility, endurance, ROM of core, proximal hip and LE for functional self-care, mobility, lifting and ambulation/stair navigation   [] (53113)Reviewed/Progressed HEP activities related to improving balance, coordination, kinesthetic sense, posture, motor skill, proprioception of core, proximal hip and LE for self care, mobility, lifting, and ambulation/stair navigation      Manual Treatments:  PROM / STM / Oscillations-Mobs:  G-I, II, III, IV (PA's, Inf., Post.)  [x] (56596) Provided manual therapy to mobilize LE, proximal hip and/or LS spine soft tissue/joints for the purpose of modulating pain, promoting relaxation,  increasing ROM, reducing/eliminating soft tissue swelling/inflammation/restriction, improving soft tissue extensibility and allowing for proper ROM for normal function with self care, mobility, lifting and ambulation. Modalities:     [x] GAME READY (VASO)- for significant edema, swelling, pain control. Charges  Timed Code Treatment Minutes: 42   Total Treatment Minutes: 62       BWC time in/ time out: 1:38 - 2:35   TE time in /out 1:46 - 2:20   Manual time in/out 1:38 - 1:46   Neuro re ed time in/out    Ultrasound    Untimed minutes 2:20 - 2:35       [] EVAL (LOW) 40244   [] EVAL (MOD) 50584  [] EVAL (HIGH) 30584   [] RE-EVAL     [x] VR(56473) x 2   [] IONTO  [] NMR (94244) x    [x] VASO  [x] Manual (38721) x 1     [] Other: ice  [] TA x      [] Mech Traction (32888)  [] ES(attended) (64795)      [] ES (un) (21558):       GOALS:   Patient stated goal: strengthen, jog. Jump, doing downstairs.    [] Progressing: [] Met: [] Not Met: [] Adjusted    Therapist goals for Patient:   Short Term Goals: To be achieved in: 2 weeks  1. Independent in HEP and progression per patient tolerance, in order to prevent re-injury. [] Progressing: [] Met: [] Not Met: [] Adjusted  2. Patient will have a decrease in pain to facilitate improvement in movement, function, and ADLs as indicated by Functional Deficits. [] Progressing: [] Met: [] Not Met: [] Adjusted    Long Term Goals: To be achieved in: 6 weeks  1. Disability index score of 57 or more for FOTO to assist with reaching prior level of function. [] Progressing: [] Met: [] Not Met: [] Adjusted  2. Patient will demonstrate increased AROM to 130 to allow for proper joint functioning as indicated by patients Functional Deficits. [] Progressing: [x] Met: [] Not Met: [] Adjusted  3. Patient will demonstrate an increase in Strength to good proximal hip strength and control, within 5lb HHD in LE to allow for proper functional mobility as indicated by patients Functional Deficits. [x] Progressing: [] Met: [] Not Met: [] Adjusted  4. Patient will return to ascending and descending stairs with symmetrical, reciprocal gait without increased symptoms or restriction. [x] Progressing: [] Met: [] Not Met: [] Adjusted  5. Able to run at work to diffuse conflict. [x] Progressing: [] Met: [] Not Met: [] Adjusted  6. Able to work 13 hour shift without knee buckling/ giving way, swelling. Progression Towards Functional goals:  [x] Patient is progressing as expected towards functional goals listed. [] Progression is slowed due to complexities listed. [] Progression has been slowed due to co-morbidities. [] Plan just implemented, too soon to assess goals progression  [] Other:       Overall Progression Towards Functional goals/ Treatment Progress Update:  [x] Patient is progressing as expected towards functional goals listed. [] Progression is slowed due to complexities/Impairments listed. [] Progression has been slowed due to co-morbidities.   [] Plan just implemented, too soon to assess goals progression <30days   [] Goals require adjustment due to lack of progress  [] Patient is not progressing as expected and requires additional follow up with physician  [] Other    Prognosis for POC: [x] Good [] Fair  [] Poor      Patient requires continued skilled intervention: [x] Yes  [] No    Treatment/Activity Tolerance:  [x] Patient able to complete treatment  [] Patient limited by fatigue  [] Patient limited by pain    [] Patient limited by other medical complications  [] Other:     ASSESSMENT: Pt is making progress towards goals. Pt completed program without any breaks. Had increased soreness by end of visit. Addressed pain with vaso       PLAN: See eval  [x] Continue per plan of care [] Alter current plan (see comments above)  [] Plan of care initiated [] Hold pending MD visit [] Discharge      Electronically signed by:  Zhane Lim PT    Note: If patient does not return for scheduled/ recommended follow up visits, this note will serve as a discharge from care along with most recent update on progress.

## 2022-05-02 ENCOUNTER — HOSPITAL ENCOUNTER (OUTPATIENT)
Dept: PHYSICAL THERAPY | Age: 47
Setting detail: THERAPIES SERIES
Discharge: HOME | End: 2022-05-02
Payer: COMMERCIAL

## 2022-05-02 PROCEDURE — 97110 THERAPEUTIC EXERCISES: CPT | Performed by: PHYSICAL THERAPIST

## 2022-05-02 PROCEDURE — 97140 MANUAL THERAPY 1/> REGIONS: CPT | Performed by: PHYSICAL THERAPIST

## 2022-05-02 NOTE — FLOWSHEET NOTE
[facility], [address]  Phone: [phone]  Fax [phone]    Physical Therapy Treatment Note/ Progress Report:           Date:  2022    Patient Name:  Vidya Guteirrez    :  1975  MRN: [id number]  Restrictions/Precautions:    Medical/Treatment Diagnosis Information:  · Diagnosis: right knee strain S86.911D, right knee OA M17.11, right knee degenerative lateral meniscus  M23.301, right knee effusion M25.461  · Treatment Diagnosis: right knee OA M17.11, right knee strain S24.678I  Insurance/Certification information:  PT Insurance Information:  APPROVED FOR PHYSICAL THERAPY 2-3 X'S WEEK FOR 4-6 WEEKS 12 VISITS 3-- 2022  Physician Information:  Referring Practitioner: Dr. Selvin Amador  Has the plan of care been signed (Y/N):        []  Yes  [x]  No     Date of Patient follow up with Physician: nsv      Is this a Progress Report:     []  Yes  [x]  No        If Yes:  Date Range for reporting period:  Beginning: 3/31/22  Endin22    Progress report will be due (10 Rx or 30 days whichever is less):        Recertification will be due (POC Duration  / 90 days whichever is less):       Visit # Insurance Allowable Auth Required   In-person 10 12 until 22 []  Yes []  No    Telehealth   []  Yes []  No    Total          Therapy Diagnosis/Practice Pattern:E      Number of Comorbidities:  []0     [x]1-2    []3+    Latex Allergy:  [x]NO      []YES  Language for Healthcare:   [x]English       []other:    SUBJECTIVE: Pt was able to plant garden. Pt has no c/o knee pain today. Pt does experience fear and sometimes pain with descending stairs.      OBJECTIVE: See eval   Observation:    Test measurements:    ROM RIGHT current   Knee ext 0 0   Knee Flex 125 130   Ankle DF      Strength  RIGHT    HIP Flexors 5    HIP Abductors      Knee EXT (quad) 4+    Knee Flex (HS) 4    Ankle DF 5 Circumference    50.0 cm           Pain scale 2/10 0 / 10   Foto 44         RESTRICTIONS/PRECAUTIONS: OA    Exercises/Interventions:     Therapeutic Ex (12805 Sets/sec Reps Notes/CUES   Bike/ elliptical 5 min 4 min       Supine HS s/  Supine ITB s :30 3x    Supine mod fig 4/  Supine piriformis 3 x :20 3x :20       Seated HS s/  Seated gastroc s hep   clamshells lime  20 x     Bridges 5\"  20  x    Standing HR off step  20 x        DYLON  Hip abd 60# 2 x 10    LSD 4 inch 2 x 10     Post glide  X 15     Leg press   Bilat/   ecc 130# x30     Incline stretch  20\"  3 x     HS curl 40# 2 x 10        Standing quad s :20 3x     Manual Intervention (14830)      Patellar mob , manual GS/ hS stretch, PROM of knee, prone quad stretch   X 3 min    Roller to ITB and piriformis  5 min     patella tendon. NMR re-education (29827)   CUES NEEDED   LBW LVL 2 laps    Tandem stance on foam     SLS  foam :30 3x     Prone plank :20 3x                                   Therapeutic Activity (25033)                                                Therapeutic Exercise and NMR EXR  [x] (55809) Provided verbal/tactile cueing for activities related to strengthening, flexibility, endurance, ROM for improvements in LE, proximal hip, and core control with self care, mobility, lifting, ambulation.  [] (19938) Provided verbal/tactile cueing for activities related to improving balance, coordination, kinesthetic sense, posture, motor skill, proprioception  to assist with LE, proximal hip, and core control in self care, mobility, lifting, ambulation and eccentric single leg control.      NMR and Therapeutic Activities:    [] (58598 or 49305) Provided verbal/tactile cueing for activities related to improving balance, coordination, kinesthetic sense, posture, motor skill, proprioception and motor activation to allow for proper function of core, proximal hip and LE with self care and ADLs  [] (75456) Gait Re-education- Provided training and instruction to the patient for proper LE, core and proximal hip recruitment and positioning and eccentric body weight control with ambulation re-education including up and down stairs     Home Exercise Program:    [x] (25281) Reviewed/Progressed HEP activities related to strengthening, flexibility, endurance, ROM of core, proximal hip and LE for functional self-care, mobility, lifting and ambulation/stair navigation   [] (96268)Reviewed/Progressed HEP activities related to improving balance, coordination, kinesthetic sense, posture, motor skill, proprioception of core, proximal hip and LE for self care, mobility, lifting, and ambulation/stair navigation      Manual Treatments:  PROM / STM / Oscillations-Mobs:  G-I, II, III, IV (PA's, Inf., Post.)  [x] (54781) Provided manual therapy to mobilize LE, proximal hip and/or LS spine soft tissue/joints for the purpose of modulating pain, promoting relaxation,  increasing ROM, reducing/eliminating soft tissue swelling/inflammation/restriction, improving soft tissue extensibility and allowing for proper ROM for normal function with self care, mobility, lifting and ambulation. Modalities:     [x] GAME READY (VASO)- for significant edema, swelling, pain control. Charges  Timed Code Treatment Minutes: 49   Total Treatment Minutes: 60       BWC time in/ time out: 1:35 - 2:35   TE time in /out 1:44 - 2:24   Manual time in/out 1:35 - 1:43   Neuro re ed time in/out    Ultrasound    Untimed minutes 2:25- 2:35       [] EVAL (LOW) 51190   [] EVAL (MOD) 54173  [] EVAL (HIGH) 83728   [] RE-EVAL     [x] WM(37565) x 2   [] IONTO  [] NMR (50730) x    [] VASO  [x] Manual (34773) x 1     [x] Other: ice  [] TA x      [] Mech Traction (71772)  [] ES(attended) (13681)      [] ES (un) (81174):       GOALS:   Patient stated goal: strengthen, jog. Jump, doing downstairs. [] Progressing: [] Met: [] Not Met: [] Adjusted    Therapist goals for Patient:   Short Term Goals:  To be achieved in: 2 weeks  1. Independent in HEP and progression per patient tolerance, in order to prevent re-injury. [] Progressing: [] Met: [] Not Met: [] Adjusted  2. Patient will have a decrease in pain to facilitate improvement in movement, function, and ADLs as indicated by Functional Deficits. [] Progressing: [] Met: [] Not Met: [] Adjusted    Long Term Goals: To be achieved in: 6 weeks  1. Disability index score of 57 or more for FOTO to assist with reaching prior level of function. [] Progressing: [] Met: [] Not Met: [] Adjusted  2. Patient will demonstrate increased AROM to 130 to allow for proper joint functioning as indicated by patients Functional Deficits. [] Progressing: [x] Met: [] Not Met: [] Adjusted  3. Patient will demonstrate an increase in Strength to good proximal hip strength and control, within 5lb HHD in LE to allow for proper functional mobility as indicated by patients Functional Deficits. [x] Progressing: [] Met: [] Not Met: [] Adjusted  4. Patient will return to ascending and descending stairs with symmetrical, reciprocal gait without increased symptoms or restriction. [x] Progressing: [] Met: [] Not Met: [] Adjusted  5. Able to run at work to diffuse conflict. [x] Progressing: [] Met: [] Not Met: [] Adjusted  6. Able to work 13 hour shift without knee buckling/ giving way, swelling. Progression Towards Functional goals:  [x] Patient is progressing as expected towards functional goals listed. [] Progression is slowed due to complexities listed. [] Progression has been slowed due to co-morbidities. [] Plan just implemented, too soon to assess goals progression  [] Other:       Overall Progression Towards Functional goals/ Treatment Progress Update:  [x] Patient is progressing as expected towards functional goals listed. [] Progression is slowed due to complexities/Impairments listed. [] Progression has been slowed due to co-morbidities.   [] Plan just implemented, too soon to assess goals progression <30days   [] Goals require adjustment due to lack of progress  [] Patient is not progressing as expected and requires additional follow up with physician  [] Other    Prognosis for POC: [x] Good [] Fair  [] Poor      Patient requires continued skilled intervention: [x] Yes  [] No    Treatment/Activity Tolerance:  [x] Patient able to complete treatment  [] Patient limited by fatigue  [] Patient limited by pain    [] Patient limited by other medical complications  [] Other:     ASSESSMENT: Pt demonstrates good ROM and increasing strength. Prepare for DC in next visits. PLAN: See eval  [x] Continue per plan of care [] Alter current plan (see comments above)  [] Plan of care initiated [] Hold pending MD visit [] Discharge      Electronically signed by:  Nichelle Lester PT    Note: If patient does not return for scheduled/ recommended follow up visits, this note will serve as a discharge from care along with most recent update on progress.

## 2022-05-05 ENCOUNTER — HOSPITAL ENCOUNTER (OUTPATIENT)
Dept: PHYSICAL THERAPY | Age: 47
Setting detail: THERAPIES SERIES
Discharge: HOME | End: 2022-05-05
Payer: COMMERCIAL

## 2022-05-05 PROCEDURE — 97140 MANUAL THERAPY 1/> REGIONS: CPT | Performed by: PHYSICAL THERAPIST

## 2022-05-05 PROCEDURE — 97110 THERAPEUTIC EXERCISES: CPT | Performed by: PHYSICAL THERAPIST

## 2022-05-05 NOTE — FLOWSHEET NOTE
[facility], [address]  Phone: [phone]  Fax [phone]    Physical Therapy Treatment Note/ Progress Report:           Date:  2022    Patient Name:  Les Barboza    :  1975  MRN: [id number]  Restrictions/Precautions:    Medical/Treatment Diagnosis Information:  · Diagnosis: right knee strain S86.911D, right knee OA M17.11, right knee degenerative lateral meniscus  M23.301, right knee effusion M25.461  · Treatment Diagnosis: right knee OA M17.11, right knee strain I20.739S  Insurance/Certification information:  PT Insurance Information:  APPROVED FOR PHYSICAL THERAPY 2-3 X'S WEEK FOR 4-6 WEEKS 12 VISITS 3-- 2022  Physician Information:  Referring Practitioner: Dr. Kurtis Calderon  Has the plan of care been signed (Y/N):        []  Yes  [x]  No     Date of Patient follow up with Physician: nsv      Is this a Progress Report:     []  Yes  [x]  No        If Yes:  Date Range for reporting period:  Beginning: 3/31/22  Endin22    Progress report will be due (10 Rx or 30 days whichever is less):        Recertification will be due (POC Duration  / 90 days whichever is less):       Visit # Insurance Allowable Auth Required   In-person  until 22 []  Yes []  No    Telehealth   []  Yes []  No    Total          Therapy Diagnosis/Practice Pattern:E      Number of Comorbidities:  []0     [x]1-2    []3+    Latex Allergy:  [x]NO      []YES  Language for Healthcare:   [x]English       []other:    SUBJECTIVE: Pt was able to plant garden. Pt has no c/o knee pain today. Pt does experience fear and sometimes pain with descending stairs.      OBJECTIVE: See eval   Observation:    Test measurements:    ROM RIGHT current   Knee ext 0 0   Knee Flex 125 130   Ankle DF      Strength  RIGHT    HIP Flexors 5    HIP Abductors      Knee EXT (quad) 4+    Knee Flex (HS) 4    Ankle DF 5 Circumference    50.0 cm           Pain scale 2/10 0 / 10   Foto 44         RESTRICTIONS/PRECAUTIONS: OA    Exercises/Interventions:     Therapeutic Ex (38730 Sets/sec Reps Notes/CUES   Bike/ elliptical 5 min 4 min       SLR  Flex, abd  2 x 10 Supine HS s/  Supine ITB s :30 3x    Supine mod fig 4/  Supine piriformis 3 x :20 3x :20       Seated HS s/  Seated gastroc s hep   clamshells lime  20 x     Bridges 5\"  20  x    Standing HR off step  20 x        DYLON  Hip abd 60# 2 x 10    LSD 4 inch 2 x 10     Post glide  X 15     Leg press   Bilat/   ecc 130# x30     Incline stretch  20\"  3 x     HS curl 40# 2 x 10        Standing quad s :20 3x     Manual Intervention (37976)      Patellar mob , manual GS/ hS stretch, PROM of knee, prone quad stretch   X 3 min    Roller to ITB and piriformis  5 min     patella tendon. NMR re-education (01054)   CUES NEEDED   LBW LVL 2 laps    Tandem stance on foam     SLS  foam :30 3x     Prone plank :20 3x                                   Therapeutic Activity (62997)                                                Therapeutic Exercise and NMR EXR  [x] (74873) Provided verbal/tactile cueing for activities related to strengthening, flexibility, endurance, ROM for improvements in LE, proximal hip, and core control with self care, mobility, lifting, ambulation.  [] (03569) Provided verbal/tactile cueing for activities related to improving balance, coordination, kinesthetic sense, posture, motor skill, proprioception  to assist with LE, proximal hip, and core control in self care, mobility, lifting, ambulation and eccentric single leg control.      NMR and Therapeutic Activities:    [] (85736 or 93926) Provided verbal/tactile cueing for activities related to improving balance, coordination, kinesthetic sense, posture, motor skill, proprioception and motor activation to allow for proper function of core, proximal hip and LE with self care and ADLs  [] (18751) Gait Re-education- Provided training and instruction to the patient for proper LE, core and proximal hip recruitment and positioning and eccentric body weight control with ambulation re-education including up and down stairs     Home Exercise Program:    [x] (27151) Reviewed/Progressed HEP activities related to strengthening, flexibility, endurance, ROM of core, proximal hip and LE for functional self-care, mobility, lifting and ambulation/stair navigation   [] (04558)Reviewed/Progressed HEP activities related to improving balance, coordination, kinesthetic sense, posture, motor skill, proprioception of core, proximal hip and LE for self care, mobility, lifting, and ambulation/stair navigation      Manual Treatments:  PROM / STM / Oscillations-Mobs:  G-I, II, III, IV (PA's, Inf., Post.)  [x] (93645) Provided manual therapy to mobilize LE, proximal hip and/or LS spine soft tissue/joints for the purpose of modulating pain, promoting relaxation,  increasing ROM, reducing/eliminating soft tissue swelling/inflammation/restriction, improving soft tissue extensibility and allowing for proper ROM for normal function with self care, mobility, lifting and ambulation. Modalities:     [x] GAME READY (VASO)- for significant edema, swelling, pain control. Charges  Timed Code Treatment Minutes: 49   Total Treatment Minutes: 60       BWC time in/ time out: 1:35 - 2:35   TE time in /out 1:44 - 2:24   Manual time in/out 1:35 - 1:43   Neuro re ed time in/out    Ultrasound    Untimed minutes 2:25- 2:35       [] EVAL (LOW) 57542   [] EVAL (MOD) 15320  [] EVAL (HIGH) 90179   [] RE-EVAL     [x] AT(56774) x 2   [] IONTO  [] NMR (41645) x    [] VASO  [x] Manual (21896) x 1     [x] Other: ice  [] TA x      [] Mech Traction (30502)  [] ES(attended) (56854)      [] ES (un) (21078):       GOALS:   Patient stated goal: strengthen, jog. Jump, doing downstairs.    [] Progressing: [] Met: [] Not Met: [] Adjusted    Therapist goals for Patient: Short Term Goals: To be achieved in: 2 weeks  1. Independent in HEP and progression per patient tolerance, in order to prevent re-injury. [] Progressing: [] Met: [] Not Met: [] Adjusted  2. Patient will have a decrease in pain to facilitate improvement in movement, function, and ADLs as indicated by Functional Deficits. [] Progressing: [] Met: [] Not Met: [] Adjusted    Long Term Goals: To be achieved in: 6 weeks  1. Disability index score of 57 or more for FOTO to assist with reaching prior level of function. [] Progressing: [] Met: [] Not Met: [] Adjusted  2. Patient will demonstrate increased AROM to 130 to allow for proper joint functioning as indicated by patients Functional Deficits. [] Progressing: [x] Met: [] Not Met: [] Adjusted  3. Patient will demonstrate an increase in Strength to good proximal hip strength and control, within 5lb HHD in LE to allow for proper functional mobility as indicated by patients Functional Deficits. [x] Progressing: [] Met: [] Not Met: [] Adjusted  4. Patient will return to ascending and descending stairs with symmetrical, reciprocal gait without increased symptoms or restriction. [x] Progressing: [] Met: [] Not Met: [] Adjusted  5. Able to run at work to diffuse conflict. [x] Progressing: [] Met: [] Not Met: [] Adjusted  6. Able to work 13 hour shift without knee buckling/ giving way, swelling. Progression Towards Functional goals:  [x] Patient is progressing as expected towards functional goals listed. [] Progression is slowed due to complexities listed. [] Progression has been slowed due to co-morbidities. [] Plan just implemented, too soon to assess goals progression  [] Other:       Overall Progression Towards Functional goals/ Treatment Progress Update:  [x] Patient is progressing as expected towards functional goals listed. [] Progression is slowed due to complexities/Impairments listed. [] Progression has been slowed due to co-morbidities.   [] Plan just implemented, too soon to assess goals progression <30days   [] Goals require adjustment due to lack of progress  [] Patient is not progressing as expected and requires additional follow up with physician  [] Other    Prognosis for POC: [x] Good [] Fair  [] Poor      Patient requires continued skilled intervention: [x] Yes  [] No    Treatment/Activity Tolerance:  [x] Patient able to complete treatment  [] Patient limited by fatigue  [] Patient limited by pain    [] Patient limited by other medical complications  [] Other:     ASSESSMENT: Prepare for DC NV       PLAN: See eval  [x] Continue per plan of care [] Alter current plan (see comments above)  [] Plan of care initiated [] Hold pending MD visit [] Discharge      Electronically signed by:  Davonte Kirby PT    Note: If patient does not return for scheduled/ recommended follow up visits, this note will serve as a discharge from care along with most recent update on progress.

## 2022-05-09 ENCOUNTER — HOSPITAL ENCOUNTER (OUTPATIENT)
Dept: PHYSICAL THERAPY | Age: 47
Setting detail: THERAPIES SERIES
End: 2022-05-09
Payer: COMMERCIAL

## 2022-05-12 ENCOUNTER — HOSPITAL ENCOUNTER (OUTPATIENT)
Dept: PHYSICAL THERAPY | Age: 47
Setting detail: THERAPIES SERIES
Discharge: HOME | End: 2022-05-12
Payer: COMMERCIAL

## 2022-05-12 NOTE — FLOWSHEET NOTE
[facility], [address]        Physical Therapy  Cancellation/No-show Note  Patient Name:  Jitendra Taylor  :  1975   Date:  2022  Cancelled visits to date: 0  No-shows to date: 2    For today's appointment patient:  []  Cancelled  []  Rescheduled appointment  [x]  No-show     Reason given by patient:  []  Patient ill  []  Conflicting appointment  []  No transportation    []  Conflict with work  []  No reason given  [x]  Other: Patient progressed to discharge.       Comments:      Electronically signed by:  Дмитрий Choudhary PT